# Patient Record
Sex: MALE | Race: WHITE | NOT HISPANIC OR LATINO | Employment: PART TIME
[De-identification: names, ages, dates, MRNs, and addresses within clinical notes are randomized per-mention and may not be internally consistent; named-entity substitution may affect disease eponyms.]

---

## 2019-09-28 ENCOUNTER — HEALTH MAINTENANCE LETTER (OUTPATIENT)
Age: 79
End: 2019-09-28

## 2020-03-15 ENCOUNTER — HEALTH MAINTENANCE LETTER (OUTPATIENT)
Age: 80
End: 2020-03-15

## 2021-01-10 ENCOUNTER — HEALTH MAINTENANCE LETTER (OUTPATIENT)
Age: 81
End: 2021-01-10

## 2021-05-08 ENCOUNTER — HEALTH MAINTENANCE LETTER (OUTPATIENT)
Age: 81
End: 2021-05-08

## 2021-05-25 ENCOUNTER — RECORDS - HEALTHEAST (OUTPATIENT)
Dept: ADMINISTRATIVE | Facility: CLINIC | Age: 81
End: 2021-05-25

## 2021-06-16 PROBLEM — U07.1 COVID-19: Status: ACTIVE | Noted: 2021-01-05

## 2021-10-23 ENCOUNTER — HEALTH MAINTENANCE LETTER (OUTPATIENT)
Age: 81
End: 2021-10-23

## 2022-06-04 ENCOUNTER — HEALTH MAINTENANCE LETTER (OUTPATIENT)
Age: 82
End: 2022-06-04

## 2022-08-18 ENCOUNTER — HOSPITAL ENCOUNTER (EMERGENCY)
Facility: CLINIC | Age: 82
Discharge: HOME | End: 2022-08-18
Admitting: PHYSICIAN ASSISTANT
Payer: COMMERCIAL

## 2022-08-18 ENCOUNTER — APPOINTMENT (OUTPATIENT)
Dept: RADIOLOGY | Facility: CLINIC | Age: 82
End: 2022-08-18
Payer: COMMERCIAL

## 2022-08-18 VITALS
SYSTOLIC BLOOD PRESSURE: 150 MMHG | DIASTOLIC BLOOD PRESSURE: 67 MMHG | HEART RATE: 96 BPM | RESPIRATION RATE: 16 BRPM | TEMPERATURE: 98.6 F | BODY MASS INDEX: 35.03 KG/M2 | OXYGEN SATURATION: 96 % | WEIGHT: 218 LBS | HEIGHT: 66 IN

## 2022-08-18 DIAGNOSIS — J06.9 VIRAL URI WITH COUGH: ICD-10-CM

## 2022-08-18 PROBLEM — E78.5 DYSLIPIDEMIA: Status: ACTIVE | Noted: 2022-08-18

## 2022-08-18 PROBLEM — K63.5 COLON POLYP: Status: ACTIVE | Noted: 2022-08-18

## 2022-08-18 LAB
FLUAV RNA SPEC QL NAA+PROBE: NEGATIVE
FLUBV RNA RESP QL NAA+PROBE: NEGATIVE
RSV RNA SPEC NAA+PROBE: NEGATIVE
SARS-COV-2 RNA RESP QL NAA+PROBE: NEGATIVE

## 2022-08-18 PROCEDURE — 71046 X-RAY EXAM CHEST 2 VIEWS: CPT

## 2022-08-18 PROCEDURE — 99284 EMERGENCY DEPT VISIT MOD MDM: CPT | Mod: CS,25

## 2022-08-18 PROCEDURE — 87637 SARSCOV2&INF A&B&RSV AMP PRB: CPT | Performed by: PHYSICIAN ASSISTANT

## 2022-08-18 PROCEDURE — C9803 HOPD COVID-19 SPEC COLLECT: HCPCS

## 2022-08-18 ASSESSMENT — ENCOUNTER SYMPTOMS
ARTHRALGIAS: 0
CONSTIPATION: 0
ABDOMINAL PAIN: 0
SINUS PRESSURE: 1
VOMITING: 0
WHEEZING: 0
AGITATION: 0
WOUND: 0
RHINORRHEA: 0
SINUS PAIN: 1
CONFUSION: 0
FACIAL SWELLING: 0
HEADACHES: 0
LIGHT-HEADEDNESS: 0
BACK PAIN: 0
COUGH: 1
FEVER: 0
DYSURIA: 0
COLOR CHANGE: 0
CHEST TIGHTNESS: 0
ACTIVITY CHANGE: 0
FATIGUE: 0
FACIAL ASYMMETRY: 0
VOICE CHANGE: 0
DIZZINESS: 0
ADENOPATHY: 0
SHORTNESS OF BREATH: 0
SORE THROAT: 0
HEMATURIA: 0
NUMBNESS: 1
WEAKNESS: 0
SPEECH DIFFICULTY: 0
NAUSEA: 0
PALPITATIONS: 0
MYALGIAS: 0

## 2022-08-18 ASSESSMENT — ACTIVITIES OF DAILY LIVING (ADL)
ADLS_ACUITY_SCORE: 35
ADLS_ACUITY_SCORE: 35

## 2022-08-18 NOTE — DISCHARGE INSTRUCTIONS
You were seen in the emergency department for sinus congestion and cough.  Thankfully, your COVID/influenza test was negative.  Your chest x-ray does not show any evidence of pneumonia.    Please keep a close eye on the symptoms, we suspect that this is a virus.  Follow-up with primary care in 1 to 2 weeks for hospital recheck.    Return to the emergency department with any worsening shortness of breath, chest pain or other concerns

## 2022-08-18 NOTE — ED PROVIDER NOTES
EMERGENCY DEPARTMENT ENCOUNTER      NAME: Tyrel Durbin  AGE: 82 year old male  YOB: 1940  MRN: [id number]  EVALUATION DATE & TIME: 8/18/2022  2:45 PM    PCP: Steve Fuentes    ED PROVIDER: Rosemarie Bermudez PA-C      Chief Complaint   Patient presents with     Cough     Numbness       FINAL IMPRESSION:  1. Viral URI with cough          MEDICAL DECISION MAKING:    Pertinent Labs & Imaging studies reviewed. (See chart for details)  82 year old male with a h/o paroxysmal A. fib anticoagulated on warfarin presents to the Emergency Department for evaluation of cough, sinus congestion.  On exam he is alert, nontoxic-appearing and in no acute distress.  He does have mild sinus congestion.  Oropharynx is moist.  Prolonged expiratory phase throughout lungs but no crackle or wheeze.  No heart murmur appreciated.  Vitals are WNL.  He does not have any chest pain or shortness of breath.    Differential diagnosis includes COVID-19, influenza, pneumonia, other viral URI.  COVID and influenza PCR is negative.  Chest x-ray does not show any effusion, focal infiltrate or consolidation to suggest infection.  Patient is quite well-appearing, very active and no concerning findings at this time.  Discussed that this likely represents a viral URI.  He has very close follow-up with his primary care provider in the next 3 days.    He also reports this outer left thigh area of numbness and tingling which occurred yesterday, it is resolved at this time.  He does not have any focal neurological deficits.  His gait is steady and balanced.  Discussed that this could represent an element of sciatica, radiculopathy or other nerve impingement.  It is reassuring that symptoms have improved today.    There is no evidence of acute or emergent process requiring intervention at this time. Pt is appropriate for outpatient management. Provisional nature of today's diagnosis was discussed and strict return precautions were given. Pt  expressed understanding and He was discharged to home in good condition.     CRITICAL CARE: None    ED COURSE  2:40 PM  Met and evaluated patient. Discussed ED plan.   5:00 PM discharged to home in good condition by RN.       MEDICATIONS GIVEN IN THE EMERGENCY:  Medications - No data to display    NEW PRESCRIPTIONS STARTED AT TODAY'S ER VISIT  Discharge Medication List as of 8/18/2022  5:14 PM             =================================================================    HPI    Patient information was obtained from: Patient    Use of Intrepreter: N/A       Tyrel Durbin is a 82 year old male who presents for evaluation of cough and sinus congestion x2 days.  States that symptoms feel similar to his prior COVID infection.  He is vaccinated against COVID.  He does not have any known ill contacts.  He does not have any shortness of breath or chest pain.  No nausea or vomiting.  He also describes this area on the outer left thigh of numbness which she noticed while shaving his leg and had a sensation of water dripping/bugs crawling but looked down and the skin appeared normal.  This occurred yesterday morning and has slowly resolved.  It is not present at this time.  He does not have any weakness of the leg, and no extension of the paresthesia.      REVIEW OF SYSTEMS   Review of Systems   Constitutional: Negative for activity change, fatigue and fever.   HENT: Positive for sinus pressure and sinus pain. Negative for facial swelling, rhinorrhea, sore throat and voice change.    Eyes: Negative for visual disturbance.   Respiratory: Positive for cough. Negative for chest tightness, shortness of breath and wheezing.    Cardiovascular: Negative for chest pain and palpitations.   Gastrointestinal: Negative for abdominal pain, constipation, nausea and vomiting.   Genitourinary: Negative for dysuria and hematuria.   Musculoskeletal: Negative for arthralgias, back pain and myalgias.   Skin: Negative for color change, pallor,  rash and wound.   Neurological: Positive for numbness (left lateral thigh). Negative for dizziness, facial asymmetry, speech difficulty, weakness, light-headedness and headaches.   Hematological: Negative for adenopathy.   Psychiatric/Behavioral: Negative for agitation, behavioral problems and confusion.   All other systems reviewed and are negative.        PAST MEDICAL HISTORY:  No past medical history on file.    PAST SURGICAL HISTORY:  No past surgical history on file.        CURRENT MEDICATIONS:    cetirizine (ZYRTEC) 10 MG tablet  co-enzyme Q-10 30 mg capsule  fish oil-omega-3 fatty acids (FISH OIL) 300-1,000 mg capsule  GLUTATHIONE ORAL  magnesium oxide (MAG-OX) 200 mg  multivitamin therapeutic tablet  warfarin ANTICOAGULANT (COUMADIN/JANTOVEN) 5 MG tablet        ALLERGIES:  No Known Allergies    FAMILY HISTORY:  No family history on file.    SOCIAL HISTORY:   Social History     Socioeconomic History     Marital status:      Spouse name: Not on file     Number of children: Not on file     Years of education: Not on file     Highest education level: Not on file   Occupational History     Not on file   Tobacco Use     Smoking status: Not on file     Smokeless tobacco: Not on file   Substance and Sexual Activity     Alcohol use: Not on file     Drug use: Not on file     Sexual activity: Not on file   Other Topics Concern     Not on file   Social History Narrative     Not on file     Social Determinants of Health     Financial Resource Strain: Not on file   Food Insecurity: Not on file   Transportation Needs: Not on file   Physical Activity: Not on file   Stress: Not on file   Social Connections: Not on file   Intimate Partner Violence: Not on file   Housing Stability: Not on file         VITALS:  Patient Vitals for the past 24 hrs:   BP Temp Temp src Pulse Resp SpO2 Height Weight   08/18/22 1609 (!) 150/67 -- -- 96 16 96 % -- --   08/18/22 1334 (!) 148/93 98.6  F (37  C) Temporal 103 18 97 % 1.676 m (5'  "6\") 98.9 kg (218 lb)       PHYSICAL EXAM    Physical Exam  Vitals reviewed.   Constitutional:       General: He is not in acute distress.     Appearance: He is well-developed. He is not ill-appearing, toxic-appearing or diaphoretic.   HENT:      Head: Normocephalic and atraumatic.      Nose: Nose normal.      Mouth/Throat:      Mouth: Mucous membranes are moist.      Pharynx: Oropharynx is clear.   Eyes:      General: No scleral icterus.     Conjunctiva/sclera: Conjunctivae normal.   Cardiovascular:      Rate and Rhythm: Normal rate and regular rhythm.      Pulses: Normal pulses.      Heart sounds: No murmur heard.  Pulmonary:      Effort: Pulmonary effort is normal. No tachypnea or respiratory distress.      Breath sounds: Normal breath sounds. No stridor. No decreased breath sounds or wheezing.   Chest:      Chest wall: No tenderness.   Abdominal:      Palpations: Abdomen is soft. There is no mass.      Tenderness: There is no abdominal tenderness. There is no guarding or rebound.   Musculoskeletal:      Cervical back: Normal range of motion and neck supple. No rigidity.      Right lower leg: No edema.      Left lower leg: No edema.   Skin:     General: Skin is warm and dry.      Capillary Refill: Capillary refill takes less than 2 seconds.      Coloration: Skin is not pale.      Findings: No rash.   Neurological:      General: No focal deficit present.      Mental Status: He is alert and oriented to person, place, and time.      Cranial Nerves: No cranial nerve deficit.   Psychiatric:         Mood and Affect: Mood normal.         Behavior: Behavior normal.          LAB:  All pertinent labs reviewed and interpreted.    Labs Ordered and Resulted from Time of ED Arrival to Time of ED Departure   INFLUENZA A/B & SARS-COV2 PCR MULTIPLEX - Normal       Result Value    Influenza A PCR Negative      Influenza B PCR Negative      RSV PCR Negative      SARS CoV2 PCR Negative           RADIOLOGY:  Reviewed all pertinent " imaging. Please see official radiology report    Chest XR,  PA & LAT   Final Result   IMPRESSION: Mild left basilar atelectasis/scarring. No discrete airspace consolidation. Small calcified granuloma at the right lung base.      No pleural effusion or pneumothorax.      Cardiomediastinal silhouette is normal.          Rosemarie Bermudez PA-C  Emergency Medicine  [facility] EMERGENCY ROOM  [address] [id number] [phone]  Dept: [phone]    This note has in part been created with speech recognition technology and may create an occasional, unintended word/grammar substitution. Errors are generally corrected in real time. Please message me via [employer] In Basket if you note any errors requiring clarification.       Rosemarie Bermudez PA-C  08/18/22 [id number]

## 2022-08-18 NOTE — ED TRIAGE NOTES
The patient presents to the ED with a cough that has been present for 2 days. The patient has not been tested for covid. He reports a headache as well. He was concerned today because he noted some intermittent numbness in his left outer thigh.

## 2022-10-10 ENCOUNTER — HEALTH MAINTENANCE LETTER (OUTPATIENT)
Age: 82
End: 2022-10-10

## 2023-08-19 ENCOUNTER — HEALTH MAINTENANCE LETTER (OUTPATIENT)
Age: 83
End: 2023-08-19

## 2024-01-21 ENCOUNTER — ANESTHESIA EVENT (OUTPATIENT)
Dept: SURGERY | Facility: CLINIC | Age: 84
End: 2024-01-21
Payer: COMMERCIAL

## 2024-01-22 ENCOUNTER — HOSPITAL ENCOUNTER (OUTPATIENT)
Facility: CLINIC | Age: 84
Discharge: HOME | End: 2024-01-22
Attending: INTERNAL MEDICINE | Admitting: INTERNAL MEDICINE
Payer: COMMERCIAL

## 2024-01-22 ENCOUNTER — ANESTHESIA (OUTPATIENT)
Dept: SURGERY | Facility: CLINIC | Age: 84
End: 2024-01-22
Payer: COMMERCIAL

## 2024-01-22 VITALS
HEIGHT: 66 IN | BODY MASS INDEX: 35.03 KG/M2 | SYSTOLIC BLOOD PRESSURE: 124 MMHG | OXYGEN SATURATION: 96 % | HEART RATE: 109 BPM | RESPIRATION RATE: 16 BRPM | WEIGHT: 218 LBS | TEMPERATURE: 97.9 F | DIASTOLIC BLOOD PRESSURE: 73 MMHG

## 2024-01-22 LAB — COLONOSCOPY: NORMAL

## 2024-01-22 PROCEDURE — 258N000003 HC RX IP 258 OP 636: Performed by: ANESTHESIOLOGY

## 2024-01-22 PROCEDURE — 250N000009 HC RX 250: Performed by: NURSE ANESTHETIST, CERTIFIED REGISTERED

## 2024-01-22 PROCEDURE — 88305 TISSUE EXAM BY PATHOLOGIST: CPT | Mod: TC | Performed by: INTERNAL MEDICINE

## 2024-01-22 PROCEDURE — 360N000075 HC SURGERY LEVEL 2, PER MIN: Performed by: INTERNAL MEDICINE

## 2024-01-22 PROCEDURE — 250N000011 HC RX IP 250 OP 636: Performed by: NURSE ANESTHETIST, CERTIFIED REGISTERED

## 2024-01-22 PROCEDURE — 999N000141 HC STATISTIC PRE-PROCEDURE NURSING ASSESSMENT: Performed by: INTERNAL MEDICINE

## 2024-01-22 PROCEDURE — 710N000012 HC RECOVERY PHASE 2, PER MINUTE: Performed by: INTERNAL MEDICINE

## 2024-01-22 PROCEDURE — 272N000001 HC OR GENERAL SUPPLY STERILE: Performed by: INTERNAL MEDICINE

## 2024-01-22 PROCEDURE — 370N000017 HC ANESTHESIA TECHNICAL FEE, PER MIN: Performed by: INTERNAL MEDICINE

## 2024-01-22 RX ORDER — PROPOFOL 10 MG/ML
INJECTION, EMULSION INTRAVENOUS CONTINUOUS PRN
Status: DISCONTINUED | OUTPATIENT
Start: 2024-01-22 | End: 2024-01-22

## 2024-01-22 RX ORDER — LIDOCAINE 40 MG/G
CREAM TOPICAL
Status: DISCONTINUED | OUTPATIENT
Start: 2024-01-22 | End: 2024-01-22 | Stop reason: HOSPADM

## 2024-01-22 RX ORDER — HYDROMORPHONE HCL IN WATER/PF 6 MG/30 ML
0.2 PATIENT CONTROLLED ANALGESIA SYRINGE INTRAVENOUS EVERY 5 MIN PRN
Status: DISCONTINUED | OUTPATIENT
Start: 2024-01-22 | End: 2024-01-22 | Stop reason: HOSPADM

## 2024-01-22 RX ORDER — ONDANSETRON 2 MG/ML
4 INJECTION INTRAMUSCULAR; INTRAVENOUS EVERY 30 MIN PRN
Status: DISCONTINUED | OUTPATIENT
Start: 2024-01-22 | End: 2024-01-22 | Stop reason: HOSPADM

## 2024-01-22 RX ORDER — PROCHLORPERAZINE MALEATE 5 MG
5 TABLET ORAL EVERY 6 HOURS PRN
Status: DISCONTINUED | OUTPATIENT
Start: 2024-01-22 | End: 2024-01-22 | Stop reason: HOSPADM

## 2024-01-22 RX ORDER — HYDROMORPHONE HCL IN WATER/PF 6 MG/30 ML
0.4 PATIENT CONTROLLED ANALGESIA SYRINGE INTRAVENOUS EVERY 5 MIN PRN
Status: DISCONTINUED | OUTPATIENT
Start: 2024-01-22 | End: 2024-01-22 | Stop reason: HOSPADM

## 2024-01-22 RX ORDER — NALOXONE HYDROCHLORIDE 0.4 MG/ML
0.2 INJECTION, SOLUTION INTRAMUSCULAR; INTRAVENOUS; SUBCUTANEOUS
Status: DISCONTINUED | OUTPATIENT
Start: 2024-01-22 | End: 2024-01-22 | Stop reason: HOSPADM

## 2024-01-22 RX ORDER — ONDANSETRON 4 MG/1
4 TABLET, ORALLY DISINTEGRATING ORAL EVERY 30 MIN PRN
Status: DISCONTINUED | OUTPATIENT
Start: 2024-01-22 | End: 2024-01-22 | Stop reason: HOSPADM

## 2024-01-22 RX ORDER — OXYCODONE HYDROCHLORIDE 5 MG/1
10 TABLET ORAL
Status: DISCONTINUED | OUTPATIENT
Start: 2024-01-22 | End: 2024-01-22 | Stop reason: HOSPADM

## 2024-01-22 RX ORDER — LIDOCAINE HYDROCHLORIDE 10 MG/ML
INJECTION, SOLUTION INFILTRATION; PERINEURAL PRN
Status: DISCONTINUED | OUTPATIENT
Start: 2024-01-22 | End: 2024-01-22

## 2024-01-22 RX ORDER — FENTANYL CITRATE 50 UG/ML
25 INJECTION, SOLUTION INTRAMUSCULAR; INTRAVENOUS EVERY 5 MIN PRN
Status: DISCONTINUED | OUTPATIENT
Start: 2024-01-22 | End: 2024-01-22 | Stop reason: HOSPADM

## 2024-01-22 RX ORDER — OXYCODONE HYDROCHLORIDE 5 MG/1
5 TABLET ORAL
Status: DISCONTINUED | OUTPATIENT
Start: 2024-01-22 | End: 2024-01-22 | Stop reason: HOSPADM

## 2024-01-22 RX ORDER — ONDANSETRON 2 MG/ML
4 INJECTION INTRAMUSCULAR; INTRAVENOUS EVERY 6 HOURS PRN
Status: DISCONTINUED | OUTPATIENT
Start: 2024-01-22 | End: 2024-01-22 | Stop reason: HOSPADM

## 2024-01-22 RX ORDER — ONDANSETRON 2 MG/ML
4 INJECTION INTRAMUSCULAR; INTRAVENOUS
Status: DISCONTINUED | OUTPATIENT
Start: 2024-01-22 | End: 2024-01-22 | Stop reason: HOSPADM

## 2024-01-22 RX ORDER — PROPOFOL 10 MG/ML
INJECTION, EMULSION INTRAVENOUS PRN
Status: DISCONTINUED | OUTPATIENT
Start: 2024-01-22 | End: 2024-01-22

## 2024-01-22 RX ORDER — NALOXONE HYDROCHLORIDE 0.4 MG/ML
0.4 INJECTION, SOLUTION INTRAMUSCULAR; INTRAVENOUS; SUBCUTANEOUS
Status: DISCONTINUED | OUTPATIENT
Start: 2024-01-22 | End: 2024-01-22 | Stop reason: HOSPADM

## 2024-01-22 RX ORDER — FLUMAZENIL 0.1 MG/ML
0.2 INJECTION, SOLUTION INTRAVENOUS
Status: DISCONTINUED | OUTPATIENT
Start: 2024-01-22 | End: 2024-01-22 | Stop reason: HOSPADM

## 2024-01-22 RX ORDER — METOPROLOL SUCCINATE 25 MG/1
1 TABLET, EXTENDED RELEASE ORAL DAILY
COMMUNITY
Start: 2023-12-18

## 2024-01-22 RX ORDER — SODIUM CHLORIDE, SODIUM LACTATE, POTASSIUM CHLORIDE, CALCIUM CHLORIDE 600; 310; 30; 20 MG/100ML; MG/100ML; MG/100ML; MG/100ML
INJECTION, SOLUTION INTRAVENOUS CONTINUOUS
Status: DISCONTINUED | OUTPATIENT
Start: 2024-01-22 | End: 2024-01-22 | Stop reason: HOSPADM

## 2024-01-22 RX ORDER — FENTANYL CITRATE 50 UG/ML
50 INJECTION, SOLUTION INTRAMUSCULAR; INTRAVENOUS EVERY 5 MIN PRN
Status: DISCONTINUED | OUTPATIENT
Start: 2024-01-22 | End: 2024-01-22 | Stop reason: HOSPADM

## 2024-01-22 RX ORDER — ONDANSETRON 4 MG/1
4 TABLET, ORALLY DISINTEGRATING ORAL EVERY 6 HOURS PRN
Status: DISCONTINUED | OUTPATIENT
Start: 2024-01-22 | End: 2024-01-22 | Stop reason: HOSPADM

## 2024-01-22 RX ORDER — TAMSULOSIN HYDROCHLORIDE 0.4 MG/1
CAPSULE ORAL
COMMUNITY
Start: 2023-06-24

## 2024-01-22 RX ADMIN — LIDOCAINE HYDROCHLORIDE 3 ML: 10 INJECTION, SOLUTION INFILTRATION; PERINEURAL at 08:07

## 2024-01-22 RX ADMIN — PROPOFOL 125 MCG/KG/MIN: 10 INJECTION, EMULSION INTRAVENOUS at 08:07

## 2024-01-22 RX ADMIN — PROPOFOL 50 MCG/KG/MIN: 10 INJECTION, EMULSION INTRAVENOUS at 07:42

## 2024-01-22 RX ADMIN — PROPOFOL 30 MG: 10 INJECTION, EMULSION INTRAVENOUS at 08:07

## 2024-01-22 RX ADMIN — SODIUM CHLORIDE, POTASSIUM CHLORIDE, SODIUM LACTATE AND CALCIUM CHLORIDE: 600; 310; 30; 20 INJECTION, SOLUTION INTRAVENOUS at 07:37

## 2024-01-22 ASSESSMENT — ACTIVITIES OF DAILY LIVING (ADL)
ADLS_ACUITY_SCORE: 35
ADLS_ACUITY_SCORE: 35

## 2024-01-22 NOTE — ANESTHESIA POSTPROCEDURE EVALUATION
Patient: Tyrel Durbin    Procedure: Procedure(s):  COLONOSCOPY with polypectomy x12       Anesthesia Type:  MAC    Note:  Disposition: Outpatient   Postop Pain Control:             Sign Out: Well controlled pain   PONV: No   Neuro/Psych:             Sign Out: Acceptable/Baseline neuro status   Airway/Respiratory:             Sign Out: Acceptable/Baseline resp. status   CV/Hemodynamics:             Sign Out: Acceptable CV status   Other NRE: NONE   DID A NON-ROUTINE EVENT OCCUR?            Last vitals:  Vitals Value Taken Time   /73 01/22/24 0842   Temp 36.6  C (97.9  F) 01/22/24 0842   Pulse 92 01/22/24 0850   Resp     SpO2 98 % 01/22/24 0851   Vitals shown include unfiled device data.    Electronically Signed By: David Schreiber MD  January 22, 2024  2:13 PM

## 2024-01-22 NOTE — ANESTHESIA CARE TRANSFER NOTE
Patient: Tyrel Durbin    Procedure: Procedure(s):  COLONOSCOPY with polypectomy x12       Diagnosis: Screening for colon cancer [Z12.11]  History of colon polyps [Z86.010]  Diagnosis Additional Information: No value filed.    Anesthesia Type:   MAC     Note:    Oropharynx: oropharynx clear of all foreign objects  Level of Consciousness: drowsy  Oxygen Supplementation: room air    Independent Airway: airway patency satisfactory and stable    Vital Signs Stable: post-procedure vital signs reviewed and stable  Report to RN Given: handoff report given  Patient transferred to: Phase II    Handoff Report: Identifed the Patient, Identified the Reponsible Provider, Reviewed the pertinent medical history, Discussed the surgical course, Reviewed Intra-OP anesthesia mangement and issues during anesthesia, Set expectations for post-procedure period and Allowed opportunity for questions and acknowledgement of understanding      Vitals:  Vitals Value Taken Time   BP     Temp     Pulse     Resp     SpO2         Electronically Signed By: ANGELICA SOTO CRNA  January 22, 2024  8:41 AM

## 2024-01-22 NOTE — PROGRESS NOTES
Pre-procedure Note    Reason for procedure: Adenoma surveillance    History and Physical Reviewed: Reviewed, no changes.    Pre-sedation assessment:    General: alert, appears stated age, and cooperative  Airway: normal  Heart: Irregular rate  Lungs: clear to auscultation bilaterally    Sedation Plan based on assessment: MAC    Mallampati score: Class III (visualization of the soft palate and base of uvula)          ASA Classification: ASA 3 - Patient with moderate systemic disease with functional limitations    Impression: Patient deemed adequate candidate for MAC sedation    Risks, benefits and alternatives were discussed with the patient and informed consent was obtained.    Plan: colonoscopy      Kevin Keller MD 1/22/2024 7:54 AM                                               Kevin Keller MD  Thank you for the opportunity to participate in the care of this patient.   Please feel free to call me with any questions or concerns.  Phone number [phone].

## 2024-01-22 NOTE — ANESTHESIA PREPROCEDURE EVALUATION
Anesthesia Pre-Procedure Evaluation    Patient: Tyrel Durbin   MRN: [id number] : 1940        Procedure : Procedure(s):  COLONOSCOPY          Past Medical History:   Diagnosis Date    Chronic atrial fibrillation (H)     Sleep apnea       Past Surgical History:   Procedure Laterality Date    AJCC COLON CANCER, STAGE I DOCUMENTED        No Known Allergies   Social History     Tobacco Use    Smoking status: Not on file    Smokeless tobacco: Not on file   Substance Use Topics    Alcohol use: Yes     Comment: 4 drinks per week      Wt Readings from Last 1 Encounters:   24 98.9 kg (218 lb)        Anesthesia Evaluation            ROS/MED HX  ENT/Pulmonary:       Neurologic:  - neg neurologic ROS     Cardiovascular:  - neg cardiovascular ROS     METS/Exercise Tolerance: >4 METS    Hematologic:  - neg hematologic  ROS     Musculoskeletal:  - neg musculoskeletal ROS     GI/Hepatic:  - neg GI/hepatic ROS     Renal/Genitourinary:  - neg Renal ROS     Endo:  - neg endo ROS     Psychiatric/Substance Use:  - neg psychiatric ROS     Infectious Disease:  - neg infectious disease ROS     Malignancy:  - neg malignancy ROS     Other:  - neg other ROS          Physical Exam    Airway  airway exam normal      Mallampati: II       Respiratory Devices and Support         Dental  no notable dental history         Cardiovascular   cardiovascular exam normal          Pulmonary   pulmonary exam normal                OUTSIDE LABS:  CBC:   Lab Results   Component Value Date    WBC 7.4 2021    WBC 3.2 (L) 2021    HGB 13.5 (L) 2021    HGB 13.8 (L) 2021    HCT 39.8 (L) 2021    HCT 40.3 2021     2021     2021     BMP:   Lab Results   Component Value Date     2021     2021    POTASSIUM 4.3 2021    POTASSIUM 4.3 2021    CHLORIDE 108 (H) 2021    CHLORIDE 110 (H) 2021    CO2 24 2021    CO2 21 (L) 2021    BUN 14  "01/07/2021    BUN 15 01/06/2021    CR 0.71 01/07/2021    CR 0.68 (L) 01/06/2021     (H) 01/07/2021     (H) 01/06/2021     COAGS:   Lab Results   Component Value Date    INR 2.31 (H) 01/07/2021    FIBR 743 (H) 01/05/2021     POC: No results found for: \"BGM\", \"HCG\", \"HCGS\"  HEPATIC:   Lab Results   Component Value Date    ALBUMIN 2.5 (L) 01/05/2021    PROTTOTAL 6.3 01/05/2021    ALT 26 01/05/2021    AST 37 01/05/2021    ALKPHOS 67 01/05/2021    BILITOTAL 0.7 01/05/2021     OTHER:   Lab Results   Component Value Date    RANDY 8.2 (L) 01/07/2021    CRP 2.1 (H) 01/07/2021       Anesthesia Plan    ASA Status:  3       Anesthesia Type: MAC.              Consents    Anesthesia Plan(s) and associated risks, benefits, and realistic alternatives discussed. Questions answered and patient/representative(s) expressed understanding.     - Discussed:     - Discussed with:  Patient            Postoperative Care            Comments:               David Schreiber MD    I have reviewed the pertinent notes and labs in the chart from the past 30 days and (re)examined the patient.  Any updates or changes from those notes are reflected in this note.            # Drug Induced Coagulation Defect: home medication list includes an anticoagulant medication   # Obesity: Estimated body mass index is 35.19 kg/m  as calculated from the following:    Height as of this encounter: 1.676 m (5' 6\").    Weight as of this encounter: 98.9 kg (218 lb).      "

## 2024-01-23 LAB
PATH REPORT.COMMENTS IMP SPEC: NORMAL
PATH REPORT.COMMENTS IMP SPEC: NORMAL
PATH REPORT.FINAL DX SPEC: NORMAL
PATH REPORT.GROSS SPEC: NORMAL
PATH REPORT.MICROSCOPIC SPEC OTHER STN: NORMAL
PATH REPORT.RELEVANT HX SPEC: NORMAL
PHOTO IMAGE: NORMAL

## 2024-01-23 PROCEDURE — 88305 TISSUE EXAM BY PATHOLOGIST: CPT | Mod: 26 | Performed by: PATHOLOGY

## 2024-10-12 ENCOUNTER — HEALTH MAINTENANCE LETTER (OUTPATIENT)
Age: 84
End: 2024-10-12

## 2024-11-21 ENCOUNTER — TRANSCRIBE ORDERS (OUTPATIENT)
Dept: OTHER | Age: 84
End: 2024-11-21

## 2024-11-21 DIAGNOSIS — Z95.2 S/P TAVR (TRANSCATHETER AORTIC VALVE REPLACEMENT): ICD-10-CM

## 2024-11-21 DIAGNOSIS — I35.0 CALCIFIC AORTIC VALVE STENOSIS: Primary | ICD-10-CM

## 2024-11-26 ENCOUNTER — HOSPITAL ENCOUNTER (OUTPATIENT)
Dept: CARDIAC REHAB | Facility: CLINIC | Age: 84
Discharge: HOME | End: 2024-11-26
Attending: NURSE PRACTITIONER
Payer: COMMERCIAL

## 2024-11-26 PROCEDURE — 93798 PHYS/QHP OP CAR RHAB W/ECG: CPT

## 2024-11-26 PROCEDURE — 93797 PHYS/QHP OP CAR RHAB WO ECG: CPT

## 2024-12-03 ENCOUNTER — HOSPITAL ENCOUNTER (EMERGENCY)
Facility: CLINIC | Age: 84
Discharge: HOME | End: 2024-12-03
Attending: EMERGENCY MEDICINE | Admitting: EMERGENCY MEDICINE
Payer: COMMERCIAL

## 2024-12-03 ENCOUNTER — HOSPITAL ENCOUNTER (OUTPATIENT)
Dept: CARDIAC REHAB | Facility: CLINIC | Age: 84
Discharge: HOME | End: 2024-12-03
Attending: NURSE PRACTITIONER
Payer: COMMERCIAL

## 2024-12-03 VITALS
TEMPERATURE: 97.1 F | HEART RATE: 94 BPM | BODY MASS INDEX: 36.48 KG/M2 | WEIGHT: 226 LBS | DIASTOLIC BLOOD PRESSURE: 65 MMHG | SYSTOLIC BLOOD PRESSURE: 144 MMHG | RESPIRATION RATE: 18 BRPM | OXYGEN SATURATION: 96 %

## 2024-12-03 DIAGNOSIS — T81.72XA THROMBOPHLEBITIS DUE TO PROCEDURE: ICD-10-CM

## 2024-12-03 DIAGNOSIS — I80.9 THROMBOPHLEBITIS DUE TO PROCEDURE: ICD-10-CM

## 2024-12-03 PROCEDURE — 93798 PHYS/QHP OP CAR RHAB W/ECG: CPT

## 2024-12-03 PROCEDURE — 99282 EMERGENCY DEPT VISIT SF MDM: CPT

## 2024-12-03 ASSESSMENT — COLUMBIA-SUICIDE SEVERITY RATING SCALE - C-SSRS
1. IN THE PAST MONTH, HAVE YOU WISHED YOU WERE DEAD OR WISHED YOU COULD GO TO SLEEP AND NOT WAKE UP?: NO
6. HAVE YOU EVER DONE ANYTHING, STARTED TO DO ANYTHING, OR PREPARED TO DO ANYTHING TO END YOUR LIFE?: NO
2. HAVE YOU ACTUALLY HAD ANY THOUGHTS OF KILLING YOURSELF IN THE PAST MONTH?: NO

## 2024-12-03 NOTE — ED NOTES
Pt left the ER department prior to receiving discharge paperwork. Therefore, no discharge vital taken as room was found empty after the provider saw them.

## 2024-12-03 NOTE — ED PROVIDER NOTES
Emergency Department Midlevel Supervisory Note     I had a face to face encounter with this patient seen by the Advanced Practice Provider ([facility]). I personally made/approved the management plan and take responsibility for the patient management. I personally saw patient and performed a substantive portion of the visit including all aspects of the medical decision making.     ED Course:  1:30 PM Sherlyn Collier PA-C staffed patient with me. I agree with their assessment and plan of management, and I will see the patient.  1:40 PM I met with the patient to introduce myself, gather additional history, perform my initial exam, and discuss the plan.     Brief HPI:     Tyrel Durbin is a 84 year old male who presents for evaluation of right forearm discomfort.  He reports firmness and discomfort just distal to recent IV access site.  Pain actually improving after onset 4 days earlier.  Denies any chest pain shortness of breath.  No fevers or chills.      I, Blas John, am serving as a scribe to document services personally performed by Kevin Gamble MD, based on my observations and the provider's statements to me.   I, Kevin Gamble MD,  attest that Blas John was acting in a scribe capacity, has observed my performance of the services and has documented them in accordance with my direction.    Brief Physical Exam: BP (!) 144/65   Pulse 94   Temp 97.1  F (36.2  C) (Oral)   Resp 18   Wt 102.5 kg (226 lb)   SpO2 96%   BMI 36.48 kg/m    Constitutional:  Alert, in mild acute distress  EYES: Conjunctivae clear  HENT:  Atraumatic  Respiratory:  Respirations even, unlabored, in no acute respiratory distress  Cardiovascular:  Regular rate and rhythm, good peripheral perfusion  GI: Soft, non-distended, non-tender  Musculoskeletal:  Moves all 4 extremities equally, grossly symmetrical strength  Integument: Warm & dry. No appreciable rash, minimal faint erythema to the mid dorsal radial aspect of the forearm.  Slight  firmness and tenderness.      Neurologic:  Alert & oriented, speech clear and fluent, no focal deficits noted  Psych: Normal mood and affect       MDM:  Patient with right forearm discomfort just distal to recent IV access site.  Presentation and findings consistent with superficial thrombophlebitis.  No evidence of obvious cellulitic process.  Patient reports symptoms improving.  No indications for laboratory evaluation and imaging.  Routine return precautions given.       Right forearm thrombophlebitis      [facility] EMERGENCY ROOM  [address] [id number] [phone]       Keivn Gamble MD  12/03/24 [id number]

## 2024-12-03 NOTE — DISCHARGE INSTRUCTIONS
Based on your physical exam and recent surgery, your symptoms are very consistent with superficial thrombophlebitis. This is inflammation of the veins that are commonly caused by IV insertion sites. Thankfully, the overlying skin does not appear infected and does not require antibiotics. For symptoms, please use tylenol and ibuprofen as needed for discomfort. I also recommend warm compresses for 10 mins daily, up to 4 times per day. Continue to follow-up with your PCP. Please return to the ED for any new or worsening symptoms.

## 2024-12-03 NOTE — ED PROVIDER NOTES
Emergency Department Encounter   NAME: Tyrel Durbin  AGE: 84 year old male   YOB: 1940 ;   MRN: [id number] ;    ED PROVIDER: Sherlyn Collier PA-C    PCP: Steve Fuentes    Evaluation Date & Time:   12/3/2024  1:29 PM    CHIEF COMPLAINT:  Arm Pain      FINAL IMPRESSION:    ICD-10-CM    1. Thrombophlebitis due to procedure  T81.72XA     I80.9     R arm          IMPRESSION AND PLAN   MDM: Tyrel Durbin is a 84 year old male with a pertinent history of A-fib currently anticoagulated with warfarin, HLD, aortic stenosis s/p TAVR on 11/20/2024 who presents to the ED by walk-in for evaluation of erythema and tenderness on his RUE after IV insertion from TAVR procedure. Patient informed by cardiology clinic to seek further evaluation in ED due to concerns for blood clot.    Vitals - hypertensive otherwise vitally stable. On exam patient is well-appearing and in no acute distress. Small area of erythema just distal to the antecubital fossa on the RUE. There is a palpable mass underlying this area. No ecchymosis, edema or open wounds. Active ROM of RUE intact. Distal CMS and pulses intact. Given that patient is on anticoagulation therapy, I have low suspicion for DVT of upper extremity. Based on recent surgery and symptomatology, I highly suspect superficial thrombophlebitis to be the cause of patient's symptoms. Certainly no warmth, edema or open wounds to suggest cellulitis vs abscess. Patient denies chest pain, SOB. He is not tachycardic and satting at 96% RA, little concern for PE.       I discussed my physical exam findings with patient and he expressed his understanding. I stated that clinically, the symptoms are consistent with superficial thrombophlebitis. Due to my low suspicion for DVT, I do not feel that blood work or imaging are necessary at this time. I recommended patient take tylenol and use warm compresses on the area for the next several days until his symptoms resolve. Without an overlying  skin infection, I do not feel that antibiotics are necessary. Patient is agreeable to this plan and feels comfortable with discharge at this time.     Patient seen in conjunction with Dr. Gamble.    Medical Decision Making  Obtained supplemental history:Supplemental history obtained?: No  Reviewed external records: External records reviewed?: Documented in chart and Inpatient Record: [facility] Admission Summary from 11/21/24.   Care impacted by chronic illness:Documented in Chart  Care significantly affected by social determinants of health:N/A  Did you consider but not order tests?: Work up considered but not performed and documented in chart, if applicable  Did you interpret images independently?: Independent interpretation of ECG and images noted in documentation, when applicable.  Consultation discussion with other provider:Did you involve another provider (consultant, , pharmacy, etc.)?: No  Discharge. No recommendations on prescription strength medication(s). See documentation for any additional details.    Not Applicable       ED COURSE:  1:33 PM I met and introduced myself to the patient. I gathered initial history and performed my physical exam. We discussed plan for initial workup.   1:39 PM I have staffed the patient with Dr. Gamble, ED MD, who has evaluated the patient and agrees with all aspects of today's care.   1:48 PM I rechecked the patient and discussed results, discharge, follow up, and reasons to return to the ED.           MEDICATIONS GIVEN IN THE EMERGENCY DEPARTMENT:  Medications - No data to display      NEW PRESCRIPTIONS STARTED AT TODAY'S ED VISIT:  Discharge Medication List as of 12/3/2024  1:50 PM            BRIEF HPI   Patient information was obtained from: Patient   Use of Intrepreter: N/A     Tyrel Durbin is a 84 year old male who presents emergency department for mild tenderness, erythema and and a palpable mass just distal to the antecubital fossa of the right arm.  Patient  reports that 10 days ago, he did undergo a TAVR due to to worsening aortic valve calcification/stenosis.  Patient states the IV was placed directly in the area where he is experiencing symptoms.  He did call his cardiology clinic this morning they recommended he be seen in the ED for further evaluation and to rule out a blood clot.  Patient otherwise denies fever, chills, chest pain, shortness of breath, inability to move the extremity.    I reviewed hospital discharge note from 11/21/2024.  Patient hospitalized for aortic valve stenosis that was recently progressed to severe with mild CAD.  He was admitted for an elective TAVR procedure.  Patient was performed without any complication, patient tolerated the procedure well.  Warfarin was resumed the evening postprocedure.  Patient started on daily baby aspirin s/p TAVR, no Plavix due to high risk of bleeding with Coumadin already.  Patient discharged in stable condition.      REVIEW OF SYSTEMS:  Pertinent positive and negative symptoms per HPI.       MEDICAL HISTORY     Past Medical History:   Diagnosis Date    Chronic atrial fibrillation (H)     Sleep apnea        Past Surgical History:   Procedure Laterality Date    AJCC COLON CANCER, STAGE I DOCUMENTED      COLONOSCOPY N/A 1/22/2024    Procedure: COLONOSCOPY with polypectomy x12;  Surgeon: Kevin Keller MD;  Location: [facility] Main OR       No family history on file.    Social History     Vaping Use    Vaping status: Never Used   Substance Use Topics    Alcohol use: Yes     Comment: 4 drinks per week    Drug use: Never         PHYSICAL EXAM     First Vitals:  Patient Vitals for the past 24 hrs:   BP Temp Temp src Pulse Resp SpO2 Weight   12/03/24 1324 (!) 144/65 97.1  F (36.2  C) Oral 94 18 96 % 102.5 kg (226 lb)       PHYSICAL EXAM:  Physical Exam  Vitals and nursing note reviewed.   Constitutional:       General: He is not in acute distress.     Appearance: Normal appearance. He is normal weight. He is  not ill-appearing or toxic-appearing.   Musculoskeletal:      Right elbow: Normal.      Right forearm: Swelling and tenderness present. No deformity, lacerations or bony tenderness.      Left forearm: Normal.      Right wrist: Normal.   Skin:     General: Skin is warm and dry.      Comments: Small area of erythema just distal to the antecubital fossa on the RUE. There is a palpable mass underlying this area. No ecchymosis, edema or open wounds.   Neurological:      General: No focal deficit present.      Mental Status: He is alert and oriented to person, place, and time.   Psychiatric:         Mood and Affect: Mood normal.          RESULTS     LAB:  All pertinent labs reviewed and interpreted  Labs Ordered and Resulted from Time of ED Arrival to Time of ED Departure - No data to display      RADIOLOGY:  No orders to display         Sherlyn Collier PA-C  Emergency Medicine   [facility] EMERGENCY ROOM       Sherlyn Collier PA-C  12/03/24 1539

## 2024-12-09 ENCOUNTER — TRANSCRIBE ORDERS (OUTPATIENT)
Dept: OTHER | Age: 84
End: 2024-12-09

## 2024-12-09 ENCOUNTER — HOSPITAL ENCOUNTER (OUTPATIENT)
Dept: CARDIAC REHAB | Facility: CLINIC | Age: 84
Discharge: HOME | End: 2024-12-09
Attending: NURSE PRACTITIONER
Payer: COMMERCIAL

## 2024-12-09 DIAGNOSIS — Z95.2 S/P TAVR (TRANSCATHETER AORTIC VALVE REPLACEMENT): Primary | ICD-10-CM

## 2024-12-09 PROCEDURE — 93798 PHYS/QHP OP CAR RHAB W/ECG: CPT

## 2024-12-11 ENCOUNTER — HOSPITAL ENCOUNTER (OUTPATIENT)
Dept: CARDIAC REHAB | Facility: CLINIC | Age: 84
Discharge: HOME | End: 2024-12-11
Attending: NURSE PRACTITIONER
Payer: COMMERCIAL

## 2024-12-11 PROCEDURE — 93798 PHYS/QHP OP CAR RHAB W/ECG: CPT

## 2024-12-16 ENCOUNTER — HOSPITAL ENCOUNTER (OUTPATIENT)
Dept: CARDIAC REHAB | Facility: CLINIC | Age: 84
Discharge: HOME | End: 2024-12-16
Attending: NURSE PRACTITIONER
Payer: COMMERCIAL

## 2024-12-16 PROCEDURE — 93798 PHYS/QHP OP CAR RHAB W/ECG: CPT

## 2025-01-07 ENCOUNTER — HOSPITAL ENCOUNTER (OUTPATIENT)
Dept: CARDIAC REHAB | Facility: CLINIC | Age: 85
Discharge: HOME | End: 2025-01-07
Attending: NURSE PRACTITIONER
Payer: COMMERCIAL

## 2025-01-07 PROCEDURE — 93798 PHYS/QHP OP CAR RHAB W/ECG: CPT

## 2025-01-09 ENCOUNTER — HOSPITAL ENCOUNTER (OUTPATIENT)
Dept: CARDIAC REHAB | Facility: CLINIC | Age: 85
Discharge: HOME | End: 2025-01-09
Attending: NURSE PRACTITIONER
Payer: COMMERCIAL

## 2025-01-09 PROCEDURE — 93798 PHYS/QHP OP CAR RHAB W/ECG: CPT

## 2025-01-21 ENCOUNTER — HOSPITAL ENCOUNTER (OUTPATIENT)
Dept: CARDIAC REHAB | Facility: CLINIC | Age: 85
Discharge: HOME | End: 2025-01-21
Attending: NURSE PRACTITIONER
Payer: COMMERCIAL

## 2025-01-21 PROCEDURE — 93798 PHYS/QHP OP CAR RHAB W/ECG: CPT

## 2025-01-30 ENCOUNTER — HOSPITAL ENCOUNTER (OUTPATIENT)
Dept: CARDIAC REHAB | Facility: CLINIC | Age: 85
Discharge: HOME | End: 2025-01-30
Attending: NURSE PRACTITIONER
Payer: COMMERCIAL

## 2025-01-30 PROCEDURE — 93798 PHYS/QHP OP CAR RHAB W/ECG: CPT

## 2025-02-06 ENCOUNTER — HOSPITAL ENCOUNTER (OUTPATIENT)
Dept: CARDIAC REHAB | Facility: CLINIC | Age: 85
Discharge: HOME | End: 2025-02-06
Attending: NURSE PRACTITIONER
Payer: COMMERCIAL

## 2025-02-06 PROCEDURE — 93798 PHYS/QHP OP CAR RHAB W/ECG: CPT

## 2025-02-11 ENCOUNTER — HOSPITAL ENCOUNTER (OUTPATIENT)
Dept: CARDIAC REHAB | Facility: CLINIC | Age: 85
Discharge: HOME | End: 2025-02-11
Attending: NURSE PRACTITIONER
Payer: COMMERCIAL

## 2025-02-11 PROCEDURE — 93798 PHYS/QHP OP CAR RHAB W/ECG: CPT

## 2025-02-18 ENCOUNTER — HOSPITAL ENCOUNTER (OUTPATIENT)
Dept: CARDIAC REHAB | Facility: CLINIC | Age: 85
Discharge: HOME | End: 2025-02-18
Attending: NURSE PRACTITIONER
Payer: COMMERCIAL

## 2025-02-18 PROCEDURE — 93798 PHYS/QHP OP CAR RHAB W/ECG: CPT

## 2025-02-25 ENCOUNTER — HOSPITAL ENCOUNTER (OUTPATIENT)
Dept: CARDIAC REHAB | Facility: CLINIC | Age: 85
Discharge: HOME | End: 2025-02-25
Attending: NURSE PRACTITIONER
Payer: COMMERCIAL

## 2025-02-25 PROCEDURE — 93798 PHYS/QHP OP CAR RHAB W/ECG: CPT

## 2025-03-04 ENCOUNTER — HOSPITAL ENCOUNTER (OUTPATIENT)
Dept: CARDIAC REHAB | Facility: CLINIC | Age: 85
Discharge: HOME | End: 2025-03-04
Attending: NURSE PRACTITIONER
Payer: COMMERCIAL

## 2025-03-04 PROCEDURE — 93798 PHYS/QHP OP CAR RHAB W/ECG: CPT

## 2025-03-06 ENCOUNTER — HOSPITAL ENCOUNTER (OUTPATIENT)
Dept: CARDIAC REHAB | Facility: CLINIC | Age: 85
Discharge: HOME | End: 2025-03-06
Attending: NURSE PRACTITIONER
Payer: COMMERCIAL

## 2025-03-06 PROCEDURE — 93798 PHYS/QHP OP CAR RHAB W/ECG: CPT

## 2025-05-14 ENCOUNTER — TELEPHONE (OUTPATIENT)
Dept: NURSING | Facility: CLINIC | Age: 85
End: 2025-05-14

## 2025-05-14 ENCOUNTER — HOSPITAL ENCOUNTER (EMERGENCY)
Facility: CLINIC | Age: 85
Discharge: HOME | End: 2025-05-14
Attending: EMERGENCY MEDICINE | Admitting: EMERGENCY MEDICINE
Payer: COMMERCIAL

## 2025-05-14 VITALS
SYSTOLIC BLOOD PRESSURE: 147 MMHG | TEMPERATURE: 97.6 F | HEART RATE: 81 BPM | OXYGEN SATURATION: 96 % | RESPIRATION RATE: 18 BRPM | BODY MASS INDEX: 37.39 KG/M2 | WEIGHT: 219 LBS | HEIGHT: 64 IN | DIASTOLIC BLOOD PRESSURE: 70 MMHG

## 2025-05-14 DIAGNOSIS — I51.7 CARDIOMEGALY: ICD-10-CM

## 2025-05-14 DIAGNOSIS — R60.0 BILATERAL LOWER EXTREMITY EDEMA: ICD-10-CM

## 2025-05-14 DIAGNOSIS — I50.1 PULMONARY EDEMA CARDIAC CAUSE (H): ICD-10-CM

## 2025-05-14 LAB
ANION GAP SERPL CALCULATED.3IONS-SCNC: 10 MMOL/L (ref 7–15)
ATRIAL RATE - MUSE: 96 BPM
BASE EXCESS BLDV CALC-SCNC: -2.2 MMOL/L (ref -3–3)
BASOPHILS # BLD AUTO: 0.1 10E3/UL (ref 0–0.2)
BASOPHILS NFR BLD AUTO: 1 %
BUN SERPL-MCNC: 19.8 MG/DL (ref 8–23)
CALCIUM SERPL-MCNC: 8.2 MG/DL (ref 8.8–10.4)
CHLORIDE SERPL-SCNC: 110 MMOL/L (ref 98–107)
CREAT SERPL-MCNC: 1.01 MG/DL (ref 0.67–1.17)
DIASTOLIC BLOOD PRESSURE - MUSE: NORMAL MMHG
EGFRCR SERPLBLD CKD-EPI 2021: 73 ML/MIN/1.73M2
EOSINOPHIL # BLD AUTO: 0.3 10E3/UL (ref 0–0.7)
EOSINOPHIL NFR BLD AUTO: 4 %
ERYTHROCYTE [DISTWIDTH] IN BLOOD BY AUTOMATED COUNT: 14.6 % (ref 10–15)
GLUCOSE SERPL-MCNC: 99 MG/DL (ref 70–99)
HCO3 BLDV-SCNC: 23 MMOL/L (ref 21–28)
HCO3 SERPL-SCNC: 18 MMOL/L (ref 22–29)
HCT VFR BLD AUTO: 41.2 % (ref 40–53)
HGB BLD-MCNC: 14.1 G/DL (ref 13.3–17.7)
IMM GRANULOCYTES # BLD: 0 10E3/UL
IMM GRANULOCYTES NFR BLD: 0 %
INR PPP: 1.74 (ref 0.85–1.15)
INTERPRETATION ECG - MUSE: NORMAL
LYMPHOCYTES # BLD AUTO: 1.6 10E3/UL (ref 0.8–5.3)
LYMPHOCYTES NFR BLD AUTO: 26 %
MCH RBC QN AUTO: 33.5 PG (ref 26.5–33)
MCHC RBC AUTO-ENTMCNC: 34.2 G/DL (ref 31.5–36.5)
MCV RBC AUTO: 98 FL (ref 78–100)
MONOCYTES # BLD AUTO: 0.6 10E3/UL (ref 0–1.3)
MONOCYTES NFR BLD AUTO: 10 %
NEUTROPHILS # BLD AUTO: 3.6 10E3/UL (ref 1.6–8.3)
NEUTROPHILS NFR BLD AUTO: 59 %
NRBC # BLD AUTO: 0 10E3/UL
NRBC BLD AUTO-RTO: 0 /100
NT-PROBNP SERPL-MCNC: 298 PG/ML (ref 0–852)
O2/TOTAL GAS SETTING VFR VENT: 28 %
OXYHGB MFR BLDV: 76 % (ref 70–75)
P AXIS - MUSE: NORMAL DEGREES
PCO2 BLDV: 38 MM HG (ref 40–50)
PH BLDV: 7.38 [PH] (ref 7.32–7.43)
PLATELET # BLD AUTO: 189 10E3/UL (ref 150–450)
PO2 BLDV: 43 MM HG (ref 25–47)
POTASSIUM SERPL-SCNC: 4.7 MMOL/L (ref 3.4–5.3)
PR INTERVAL - MUSE: NORMAL MS
PROTHROMBIN TIME: 20.4 SECONDS (ref 11.8–14.8)
QRS DURATION - MUSE: 82 MS
QT - MUSE: 390 MS
QTC - MUSE: 435 MS
R AXIS - MUSE: 94 DEGREES
RBC # BLD AUTO: 4.21 10E6/UL (ref 4.4–5.9)
SAO2 % BLDV: 76.8 % (ref 70–75)
SODIUM SERPL-SCNC: 138 MMOL/L (ref 135–145)
SYSTOLIC BLOOD PRESSURE - MUSE: NORMAL MMHG
T AXIS - MUSE: 24 DEGREES
TROPONIN T SERPL HS-MCNC: 12 NG/L
TROPONIN T SERPL HS-MCNC: 15 NG/L
VENTRICULAR RATE- MUSE: 75 BPM
WBC # BLD AUTO: 6.1 10E3/UL (ref 4–11)

## 2025-05-14 PROCEDURE — 36415 COLL VENOUS BLD VENIPUNCTURE: CPT | Performed by: EMERGENCY MEDICINE

## 2025-05-14 PROCEDURE — 85025 COMPLETE CBC W/AUTO DIFF WBC: CPT | Performed by: EMERGENCY MEDICINE

## 2025-05-14 PROCEDURE — 93005 ELECTROCARDIOGRAM TRACING: CPT | Performed by: EMERGENCY MEDICINE

## 2025-05-14 PROCEDURE — 80048 BASIC METABOLIC PNL TOTAL CA: CPT | Performed by: EMERGENCY MEDICINE

## 2025-05-14 PROCEDURE — 85610 PROTHROMBIN TIME: CPT | Performed by: EMERGENCY MEDICINE

## 2025-05-14 PROCEDURE — 83880 ASSAY OF NATRIURETIC PEPTIDE: CPT | Performed by: EMERGENCY MEDICINE

## 2025-05-14 PROCEDURE — 99285 EMERGENCY DEPT VISIT HI MDM: CPT | Mod: 25

## 2025-05-14 PROCEDURE — 84484 ASSAY OF TROPONIN QUANT: CPT | Performed by: EMERGENCY MEDICINE

## 2025-05-14 PROCEDURE — 82805 BLOOD GASES W/O2 SATURATION: CPT | Performed by: EMERGENCY MEDICINE

## 2025-05-14 RX ORDER — FUROSEMIDE 20 MG/1
20 TABLET ORAL DAILY
Qty: 10 TABLET | Refills: 0 | Status: SHIPPED | OUTPATIENT
Start: 2025-05-14 | End: 2025-05-24

## 2025-05-14 ASSESSMENT — ACTIVITIES OF DAILY LIVING (ADL)
ADLS_ACUITY_SCORE: 41

## 2025-05-14 ASSESSMENT — COLUMBIA-SUICIDE SEVERITY RATING SCALE - C-SSRS
6. HAVE YOU EVER DONE ANYTHING, STARTED TO DO ANYTHING, OR PREPARED TO DO ANYTHING TO END YOUR LIFE?: NO
1. IN THE PAST MONTH, HAVE YOU WISHED YOU WERE DEAD OR WISHED YOU COULD GO TO SLEEP AND NOT WAKE UP?: NO
2. HAVE YOU ACTUALLY HAD ANY THOUGHTS OF KILLING YOURSELF IN THE PAST MONTH?: NO

## 2025-05-14 NOTE — DISCHARGE INSTRUCTIONS
As we discussed in the ER, the fluid in your legs is likely back up from a mild component of heart failure.  We recommend diuresis which involves use of a water pill to help express some of that extra fluid.  Recommend stockings for compression is much as possible during the day and elevation of your legs at night.  Taking the furosemide or water pill in the morning to clear some of that excess fluid through the kidneys and urination.  You will need to follow-up with your cardiologist within the next week for recheck of fluid buildup and swelling as well as echocardiogram likely.

## 2025-05-14 NOTE — ED PROVIDER NOTES
NAME: Tyrel Durbin  AGE: 85 year old male  YOB: 1940  MRN: [id number]  EVALUATION DATE & TIME: No admission date for patient encounter.    PCP: Steve Fuentes    ED PROVIDER: Osvaldo Khan M.D.      Chief Complaint   Patient presents with    Rash     BLE     FINAL IMPRESSION:  1. Bilateral lower extremity edema    2. Cardiomegaly    3. Pulmonary edema cardiac cause (H)      MEDICAL DECISION MAKIN:31 AM I met with the patient, obtained history, performed an initial exam, and discussed options and plan for diagnostics and treatment here in the ED.   4:04 AM I rechecked and updated the patient on results. He does not wish to be admitted.  We discussed options for treatment including outpatient diuretics and close follow-up with his cardiology team at [facility].  4:30 AM repeat troponin is negative.  Patient cleared for discharge and will start diuretics in the morning.    Patient was clinically assessed and consented to treatment. After assessment, medical decision making and workup were discussed with the patient. The patient was agreeable to plan for testing, workup, and treatment.  Pertinent Labs & Imaging studies reviewed. (See chart for details)     Medical Decision Making  I reviewed the EMR: Outpatient Record: Recent cardiology visit from 2025 as well as outpatient primary care visits and dermatology visit from April of this year  Care impacted by Heart Disease  I independently interpreted the EKG and note no acute ischemia, chronic atrial fibrillation. See radiology report for final interpretation.  Discharge. I prescribed additional prescription strength medication(s) as charted. See documentation for any additional details.    MIPS (CTPE, Dental pain, Alcantar, Sinusitis, Asthma/COPD, Head Trauma): Not Applicable    SEPSIS: None    Tyrel Durbin is a 85 year old male who presents with rash and swelling in ankle.   Differential diagnosis includes but not limited to  cellulitis, dependent edema, peripheral edema, CHF, acute kidney failure, venous stasis.  Patient is an 85-year-old male with history of heart disease and presenting for rash.  Patient has seen dermatology and found to have small blisters or bullae along with leg swelling which likely is related to edema and not any dermatologic condition.  Patient's weeping is likely related to peripheral edema and possibly related to kidney or heart disease.  Presently patient does not appear to be in any distress but does report ongoing dyspnea which actually improved after his recent TAVR.  Patient's chart review showed some chronic edema noted in primary care visits last month but going back to January cardiology visit there was not any noted any edema on exam.  Patient I discussed his cardiac workup and EKG was done which showed chronic atrial fibrillation.  Patient reports he is in atrial fibrillation most of the time and on blood thinners for it.  I did discuss with him whether he is on diuretics which he is not.  Labs were obtained and showed normal BNP, unremarkable metabolic panel and stable CBC.  Troponin was negative as well and I do not believe this is acute but rather likely building up mild heart failure possibly related to the atrial fibrillation though it does appear presently he is rate controlled.  Patient is not hypoxic nor hypercapnic on VBG.  Patient appears otherwise comfortable and ultrasound was obtained as well which ruled out DVTs in the lower extremity.  I did also perform a chest x-ray which did show some mild cardiomegaly and slight pulmonary vascular congestion which would fit with his suspected peripheral edema related to cardiac congestive heart failure.  I spoke with patient about this and discussed diuresis which she did not wish to remain in the hospital.  Given that his vital signs are stable and oxygenation is normal it would be an option to try outpatient diuresis and start him on Lasix  low-dose once a day to see if this helps.  We discussed trying this starting in the morning and following up closely with his heart care clinic at [facility].  Patient would opt for this as opposed to admission and starting diuresis here.  I would recommend he had an outpatient echocardiogram and possibly continue diuresis if this is improving as well as compression stockings and elevation as discussed in the discharge.  Patient comfortable with this plan and will be discharged.    0 minutes of critical care time    MEDICATIONS GIVEN IN THE EMERGENCY:  Medications - No data to display    NEW PRESCRIPTIONS STARTED AT TODAY'S ER VISIT:  New Prescriptions    FUROSEMIDE (LASIX) 20 MG TABLET    Take 1 tablet (20 mg) by mouth daily for 10 days.          =================================================================    HPI    Patient information was obtained from: patient    Use of : N/A        Tyrel Durbin is a 85 year old male with a past medical history of skin cancer, who presents with a rash.    For 1 month patient has noticed swelling in legs. He saw derm in [place], [place] a couple weeks and was told to get compression stockings because he started getting clear weeping blisters. Later last the blisters were hurting. No redness or pus type drainage. History of cardiac disease with a-fib and his shortness of breath has improved after valve replacement.     Chart Review:  - [facility] on 4/17/25 (~1 month ago). Discussed etiology of this chronic condition related to venous insufficiency and need for treatment to prevent ulcers and infections in the skin. Discussed treatment options as well as their side effects.   -Initiate compression stockings (medium compression) to be worn daily during AM, at least 6 hours during daytime.  -Recommend leg elevation 2-3 times daily at or above heart level for 10-15 min each.   - Recommend follow-up with cardiology or primary care regarding new onset LE  edema - patient says he intends to see cardiology shortly  - Discussed option of topical steroid. Patient prefers to start with OTC hydrocortisone ointment     REVIEW OF SYSTEMS   Review of Systems     PAST MEDICAL HISTORY:  Past Medical History:   Diagnosis Date    Chronic atrial fibrillation (H)     Sleep apnea        PAST SURGICAL HISTORY:  Past Surgical History:   Procedure Laterality Date    AJCC COLON CANCER, STAGE I DOCUMENTED      COLONOSCOPY N/A 1/22/2024    Procedure: COLONOSCOPY with polypectomy x12;  Surgeon: Kevin Keller MD;  Location: [facility] Main OR       CURRENT MEDICATIONS:    No current facility-administered medications for this encounter.    Current Outpatient Medications:     furosemide (LASIX) 20 MG tablet, Take 1 tablet (20 mg) by mouth daily for 10 days., Disp: 10 tablet, Rfl: 0    cetirizine (ZYRTEC) 10 MG tablet, [CETIRIZINE (ZYRTEC) 10 MG TABLET] Take 10 mg by mouth daily., Disp: , Rfl:     co-enzyme Q-10 30 mg capsule, [CO-ENZYME Q-10 30 MG CAPSULE] Take 30 mg by mouth daily., Disp: , Rfl:     fish oil-omega-3 fatty acids (FISH OIL) 300-1,000 mg capsule, [FISH OIL-OMEGA-3 FATTY ACIDS (FISH OIL) 300-1,000 MG CAPSULE] Take 1 g by mouth daily., Disp: , Rfl:     GLUTATHIONE ORAL, [GLUTATHIONE ORAL] Take 1 tablet by mouth daily., Disp: , Rfl:     magnesium oxide (MAG-OX) 200 mg, [MAGNESIUM OXIDE (MAG-OX) 200 MG] Take 200 mg by mouth daily., Disp: , Rfl:     metoprolol succinate ER (TOPROL XL) 25 MG 24 hr tablet, Take 1 tablet by mouth daily, Disp: , Rfl:     multivitamin therapeutic tablet, [MULTIVITAMIN THERAPEUTIC TABLET] Take 1 tablet by mouth daily., Disp: , Rfl:     tamsulosin (FLOMAX) 0.4 MG capsule, TAKE 1 CAPSULE(0.4 MG) BY MOUTH EVERY DAY AFTER A MEAL, Disp: , Rfl:     warfarin ANTICOAGULANT (COUMADIN/JANTOVEN) 5 MG tablet, [WARFARIN ANTICOAGULANT (COUMADIN/JANTOVEN) 5 MG TABLET] Take 7-8 mg by mouth See Admin Instructions. 7 mg on Mondays, Wednesdays, and Fridays;  8mg on  "Tuesdays, Thursdays, Saturdays, Sundays.  For afib, goal inr 2-3, Disp: , Rfl:     ALLERGIES:  Allergies   Allergen Reactions    Pollen Extract Itching     Dust, trees in spring       FAMILY HISTORY:  No family history on file.    SOCIAL HISTORY:   Social History     Socioeconomic History    Marital status:    Vaping Use    Vaping status: Never Used   Substance and Sexual Activity    Alcohol use: Yes     Comment: 4 drinks per week    Drug use: Never     Social Drivers of Health     Financial Resource Strain: Low Risk  (8/8/2024)    [facility]    Financial Resource Strain     Difficulty of Paying Living Expenses: 3   Food Insecurity: No Food Insecurity (8/8/2024)    Received from [facility]    Food Insecurity     Do you worry your food will run out before you are able to buy more?: 1   Transportation Needs: No Transportation Needs (8/8/2024)    Received from [facility]    Transportation Needs     Does lack of transportation keep you from medical appointments?: 1     Does lack of transportation keep you from work, meetings or getting things that you need?: 1   Social Connections: Socially Integrated (8/8/2024)    Received from [facility]    Social Connections     Do you often feel lonely or isolated from those around you?: 0   Housing Stability: Low Risk  (8/8/2024)    Received from [facility]    Housing Stability     What is your housing situation today?: 1       PHYSICAL EXAM:    Vitals: BP (!) 147/70   Pulse 81   Temp 97.6  F (36.4  C) (Temporal)   Resp 18   Ht 1.626 m (5' 4\")   Wt 99.3 kg (219 lb)   SpO2 96%   BMI 37.59 kg/m     Physical Exam  Vitals and nursing note reviewed.   Constitutional:       General: He is not in acute distress.     Appearance: Normal appearance. He is obese. He is not ill-appearing or " toxic-appearing.   HENT:      Head: Normocephalic.   Neck:      Comments: No JVD noted  Cardiovascular:      Rate and Rhythm: Normal rate. Rhythm irregular.      Heart sounds: Normal heart sounds.   Pulmonary:      Effort: Pulmonary effort is normal. No respiratory distress.      Breath sounds: No stridor. Rales (Slight bilateral basilar crackle) present. No wheezing or rhonchi.   Chest:      Chest wall: No tenderness.   Abdominal:      General: Abdomen is flat.      Palpations: Abdomen is soft.      Tenderness: There is no abdominal tenderness.   Musculoskeletal:         General: No tenderness.      Cervical back: Normal range of motion.      Right lower leg: Edema (1+ lower extremity edema, clear blistering with weeping) present.      Left lower leg: Edema present.   Skin:     Findings: No bruising, erythema or rash.   Neurological:      General: No focal deficit present.      Mental Status: He is alert.   Psychiatric:         Behavior: Behavior normal.        LAB:  All pertinent labs reviewed and interpreted.  Labs Ordered and Resulted from Time of ED Arrival to Time of ED Departure   INR - Abnormal       Result Value    INR 1.74 (*)     PT 20.4 (*)    BASIC METABOLIC PANEL - Abnormal    Sodium 138      Potassium 4.7      Chloride 110 (*)     Carbon Dioxide (CO2) 18 (*)     Anion Gap 10      Urea Nitrogen 19.8      Creatinine 1.01      GFR Estimate 73      Calcium 8.2 (*)     Glucose 99     BLOOD GAS VENOUS - Abnormal    pH Venous 7.38      pCO2 Venous 38 (*)     pO2 Venous 43      Bicarbonate Venous 23      Base Excess/Deficit Venous -2.2      FIO2 28      Oxyhemoglobin Venous 76 (*)     O2 Sat, Venous 76.8 (*)    CBC WITH PLATELETS AND DIFFERENTIAL - Abnormal    WBC Count 6.1      RBC Count 4.21 (*)     Hemoglobin 14.1      Hematocrit 41.2      MCV 98      MCH 33.5 (*)     MCHC 34.2      RDW 14.6      Platelet Count 189      % Neutrophils 59      % Lymphocytes 26      % Monocytes 10      % Eosinophils 4      %  Basophils 1      % Immature Granulocytes 0      NRBCs per 100 WBC 0      Absolute Neutrophils 3.6      Absolute Lymphocytes 1.6      Absolute Monocytes 0.6      Absolute Eosinophils 0.3      Absolute Basophils 0.1      Absolute Immature Granulocytes 0.0      Absolute NRBCs 0.0     TROPONIN T, HIGH SENSITIVITY - Normal    Troponin T, High Sensitivity 15     NT-PROBNP - Normal    NT-proBNP 298     TROPONIN T, HIGH SENSITIVITY - Normal    Troponin T, High Sensitivity 12       RADIOLOGY:  XR Chest Port 1 View   Final Result   IMPRESSION: Right lower lobe calcified granuloma. Borderline enlarged heart with mild to moderate pulmonary vascular congestion. Query trace right pleural effusion. No pneumothorax. Degenerative changes of the thoracic spine. No acute osseous    abnormality.      US Lower Extremity Venous Duplex Bilateral   Final Result   IMPRESSION:   1.  No deep venous thrombosis in the bilateral lower extremities.        EKG:   Performed at: 14-May-2025 02:07:29  Impression: Atrial fibrillation with rate control, no signs acute ST elevation ischemia, no other ectopy.  Rate: 75 bpm  Rhythm: Atrial fibrillation  QRS Interval: 82 ms  QTc Interval: 435 ms  Comparison: No previous ECGs  I have independently reviewed and interpreted the EKG(s) documented above.     PROCEDURES:   Procedures     I, Jarrell Munoz, am serving as a scribe to document services personally performed by Dr. Osvaldo Khan  based on my observation and the provider's statements to me. I, Osvaldo Khan MD attest that Jarrell Munoz is acting in a scribe capacity, has observed my performance of the services and has documented them in accordance with my direction.    Osvaldo Khan M.D.  Emergency Medicine  [facility] Emergency Department       Osvaldo Khan MD  05/14/25 9400

## 2025-05-14 NOTE — ED TRIAGE NOTES
Patient presents to the ED complaining of a weeping rash to his bilateral lower extremities for the last two weeks.  He took a Percocet and 1000 mg of Tylenol around 0100.  Denies pain at this time.       Triage Assessment (Adult)       Row Name 05/14/25 0132          Triage Assessment    Airway WDL WDL        Respiratory WDL    Respiratory WDL WDL        Skin Circulation/Temperature WDL    Skin Circulation/Temperature WDL WDL        Cardiac WDL    Cardiac WDL WDL        Peripheral/Neurovascular WDL    Peripheral Neurovascular WDL WDL        Cognitive/Neuro/Behavioral WDL    Cognitive/Neuro/Behavioral WDL WDL

## 2025-05-14 NOTE — TELEPHONE ENCOUNTER
Tyrel called requesting the MD's name who saw him in the ED, per AVS Dr Khan was the attending. Denied need for anything else.    Maryam Capellan RN on 5/14/2025 at 9:24 AM

## 2025-06-03 ENCOUNTER — HOSPITAL ENCOUNTER (EMERGENCY)
Facility: CLINIC | Age: 85
Discharge: LEFT BEFORE DISCHARGE | End: 2025-06-03
Admitting: EMERGENCY MEDICINE
Payer: COMMERCIAL

## 2025-06-03 VITALS
WEIGHT: 223 LBS | RESPIRATION RATE: 16 BRPM | HEART RATE: 66 BPM | OXYGEN SATURATION: 98 % | BODY MASS INDEX: 38.28 KG/M2 | SYSTOLIC BLOOD PRESSURE: 146 MMHG | TEMPERATURE: 97 F | DIASTOLIC BLOOD PRESSURE: 66 MMHG

## 2025-06-03 PROCEDURE — 99281 EMR DPT VST MAYX REQ PHY/QHP: CPT

## 2025-06-03 ASSESSMENT — COLUMBIA-SUICIDE SEVERITY RATING SCALE - C-SSRS
1. IN THE PAST MONTH, HAVE YOU WISHED YOU WERE DEAD OR WISHED YOU COULD GO TO SLEEP AND NOT WAKE UP?: NO
2. HAVE YOU ACTUALLY HAD ANY THOUGHTS OF KILLING YOURSELF IN THE PAST MONTH?: NO
6. HAVE YOU EVER DONE ANYTHING, STARTED TO DO ANYTHING, OR PREPARED TO DO ANYTHING TO END YOUR LIFE?: NO

## 2025-06-03 NOTE — ED TRIAGE NOTES
Pt arrives to ED with c/o ongoing bilateral leg swelling pain and wounds. Pt states left leg wound has been weeping. Right wound is getting bigger. Pt has knee replacement coming up on the 17th and wants to get these taken care of before his surgery so he can still have surgery.      Triage Assessment (Adult)       Row Name 06/03/25 1328          Triage Assessment    Airway WDL WDL        Respiratory WDL    Respiratory WDL WDL        Skin Circulation/Temperature WDL    Skin Circulation/Temperature WDL X  wounds        Cardiac WDL    Cardiac WDL WDL        Peripheral/Neurovascular WDL    Peripheral Neurovascular WDL WDL        Cognitive/Neuro/Behavioral WDL    Cognitive/Neuro/Behavioral WDL WDL                      Statement Selected

## 2025-06-04 ENCOUNTER — HOSPITAL ENCOUNTER (EMERGENCY)
Facility: CLINIC | Age: 85
Discharge: HOME | End: 2025-06-04
Attending: EMERGENCY MEDICINE | Admitting: EMERGENCY MEDICINE
Payer: COMMERCIAL

## 2025-06-04 VITALS
RESPIRATION RATE: 22 BRPM | DIASTOLIC BLOOD PRESSURE: 72 MMHG | WEIGHT: 230 LBS | HEIGHT: 64 IN | TEMPERATURE: 98.3 F | HEART RATE: 78 BPM | OXYGEN SATURATION: 97 % | BODY MASS INDEX: 39.27 KG/M2 | SYSTOLIC BLOOD PRESSURE: 145 MMHG

## 2025-06-04 DIAGNOSIS — L03.115 CELLULITIS OF RIGHT LOWER LEG: ICD-10-CM

## 2025-06-04 DIAGNOSIS — R60.0 PERIPHERAL EDEMA: ICD-10-CM

## 2025-06-04 DIAGNOSIS — Z79.01 WARFARIN ANTICOAGULATION: ICD-10-CM

## 2025-06-04 PROCEDURE — 250N000013 HC RX MED GY IP 250 OP 250 PS 637: Performed by: EMERGENCY MEDICINE

## 2025-06-04 PROCEDURE — 99284 EMERGENCY DEPT VISIT MOD MDM: CPT

## 2025-06-04 RX ORDER — FUROSEMIDE 20 MG/1
20 TABLET ORAL DAILY
Qty: 14 TABLET | Refills: 0 | Status: SHIPPED | OUTPATIENT
Start: 2025-06-05 | End: 2025-06-19

## 2025-06-04 RX ORDER — CLINDAMYCIN HYDROCHLORIDE 150 MG/1
450 CAPSULE ORAL ONCE
Status: COMPLETED | OUTPATIENT
Start: 2025-06-04 | End: 2025-06-04

## 2025-06-04 RX ORDER — CLINDAMYCIN HYDROCHLORIDE 150 MG/1
450 CAPSULE ORAL 3 TIMES DAILY
Qty: 90 CAPSULE | Refills: 0 | Status: SHIPPED | OUTPATIENT
Start: 2025-06-04 | End: 2025-06-14

## 2025-06-04 RX ORDER — CEFDINIR 300 MG/1
300 CAPSULE ORAL ONCE
Status: COMPLETED | OUTPATIENT
Start: 2025-06-04 | End: 2025-06-04

## 2025-06-04 RX ORDER — FUROSEMIDE 20 MG/1
40 TABLET ORAL ONCE
Status: COMPLETED | OUTPATIENT
Start: 2025-06-04 | End: 2025-06-04

## 2025-06-04 RX ORDER — CEFDINIR 300 MG/1
300 CAPSULE ORAL 2 TIMES DAILY
Qty: 20 CAPSULE | Refills: 0 | Status: SHIPPED | OUTPATIENT
Start: 2025-06-04 | End: 2025-06-14

## 2025-06-04 RX ADMIN — FUROSEMIDE 40 MG: 20 TABLET ORAL at 07:38

## 2025-06-04 RX ADMIN — CLINDAMYCIN HYDROCHLORIDE 450 MG: 150 CAPSULE ORAL at 07:37

## 2025-06-04 RX ADMIN — CEFDINIR 300 MG: 300 CAPSULE ORAL at 07:38

## 2025-06-04 ASSESSMENT — COLUMBIA-SUICIDE SEVERITY RATING SCALE - C-SSRS
2. HAVE YOU ACTUALLY HAD ANY THOUGHTS OF KILLING YOURSELF IN THE PAST MONTH?: NO
1. IN THE PAST MONTH, HAVE YOU WISHED YOU WERE DEAD OR WISHED YOU COULD GO TO SLEEP AND NOT WAKE UP?: NO
6. HAVE YOU EVER DONE ANYTHING, STARTED TO DO ANYTHING, OR PREPARED TO DO ANYTHING TO END YOUR LIFE?: NO

## 2025-06-04 NOTE — ED PROVIDER NOTES
"EMERGENCY DEPARTMENT ENCOUNTER      NAME: Tyrel Durbin  AGE: 85 year old male  YOB: 1940  MRN: [id number]  EVALUATION DATE & TIME: No admission date for patient encounter.    PCP: Steve Fuentes    ED PROVIDER: Evans Sousa M.D.      Chief Complaint   Patient presents with    Wound Check         IMPRESSION  1. Cellulitis of right lower leg    2. Peripheral edema    3. Warfarin anticoagulation        PLAN  - cefdinir 300mg q12h & clindamycin 450mg TID x10 days  - increase Lasix from 20mg to 40mg daily  - close PCP follow up  - discharge to home    ED COURSE & MEDICAL DECISION MAKING    ED Course as of 06/04/25 0743   Wed Jun 04, 2025   0717 Patient seen in the waiting room due to boarding crisis.   0733 85yoM with history of chronic a-fib (takes warfarin), HLD presenting for evaluation of right lower leg redness & pain. Was seen in the ED 3 weeks ago with peripheral edema and chronic mild dyspnea: BLE US negative for DVT, CXR with mild pulmonary edema but BNP within normal limits, labs otherwise unremarkable. Started on Lasix 20mg daily and discharge home. States he saw his cardiologist in follow up who continued the Lasix going forward. States his BLE swelling is ongoing; unsure if better, worse, or same. Has been wearing compression stockings and urinating as expected with the Lasix. States his shortness of breath is mildly improved; no chest pain. The past couple days, has noticed new erythema & pain to his right posterolateral calf near a chronic mild ulceration. No purulence, blistering, fevers, sweats, chills. Concerned about infection; thus came to the ED. States he is scheduled for right knee replacement in 2 weeks and wants any infection cleared before then so this is not cancelled. Notes he has had chronic small scabbed ulcerations to both lower legs \"for a long, long time\".    Normal vitals on presentation. Exam with 2+ nontender pitting edema up to knees bilaterally, RLE with about " 1cm diameter scabbed ulceration to mid posterolateral aspect with about 4cm surrounding area of bright tender erythema, no purulence/blistering/crepitus or pain with ROM of knee/ankle with distal CMS intact. LLE with about 1cm diameter scabbed ulceration to mid anterior aspect with no erythema or tenderness. Otherwise has clear lungs, normal work of breathing, benign abdomen, clear sharp mentation.   0733 Has mild cellulitis of RLE likely related to his chronic ulceration and underlying edema. No concern for necrotizing fasciitis, sepsis, septic joint. Patient comfortable not obtaining blood tests or imaging here now which I agree with. Given 1st dose of antibiotics here in the ED and prescriptions for home. Will also increase his previously-prescribed Lasix to 40mg daily for the next 2 weeks. Patient able to tolerate PO and walk without difficulty. Patient comfortable with discharge at this time. Return precautions and need for PCP follow up discussed and understood. No further questions at the time of discharge.       --------------------------------------------------------------------------------   --------------------------------------------------------------------------------   ED course timestamps entered by medical scribe:  7:18 AM I met with the patient for the initial interview and physical examination. Discussed plan for treatment and workup in the ED.  7:33 AM We discussed the plan for discharge and the patient is agreeable. Reviewed supportive cares, symptomatic treatment, outpatient follow up, and reasons to return to the Emergency Department. All questions and concerns were addressed. Patient to be discharged by ED RN.      --------------------------------------------------------------------------------  --------------------------------------------------------------------------------   This patient involved a high degree of complexity in medical decision making, as significant risks were present and  assessed. Recent encounters & results in medical record reviewed by me.    All workup (i.e. any EKG/labs/imaging as per charting below) reviewed and independently interpreted by me. See respective sections for details.    Broad differential considered for this patient, including but not limited to:  cellulitis, necrotizing fasciitis, sepsis, septic joint, myositis, compartment syndrome, DVT, bacteremia, CHF, hypothyroidism, renal failure, liver failure      See additional MDM below if interested.      MEDICATIONS GIVEN IN THE EMERGENCY DEPARTMENT  Medications   furosemide (LASIX) tablet 40 mg (40 mg Oral $Given 6/4/25 0738)   cefdinir (OMNICEF) capsule 300 mg (300 mg Oral $Given 6/4/25 0738)   clindamycin (CLEOCIN) capsule 450 mg (450 mg Oral $Given 6/4/25 0737)       NEW PRESCRIPTIONS STARTED AT TODAY'S ER VISIT  Discharge Medication List as of 6/4/2025  7:35 AM        START taking these medications    Details   cefdinir (OMNICEF) 300 MG capsule Take 1 capsule (300 mg) by mouth 2 times daily for 10 days., Disp-20 capsule, R-0, E-Prescribe      clindamycin (CLEOCIN) 150 MG capsule Take 3 capsules (450 mg) by mouth 3 times daily for 10 days., Disp-90 capsule, R-0, E-Prescribe           CONTINUE these medications which have CHANGED    Details   furosemide (LASIX) 20 MG tablet Take 1 tablet (20 mg) by mouth daily for 14 days., Disp-14 tablet, R-0, E-Prescribe           CONTINUE these medications which have NOT CHANGED    Details   cetirizine (ZYRTEC) 10 MG tablet [CETIRIZINE (ZYRTEC) 10 MG TABLET] Take 10 mg by mouth daily., Historical      co-enzyme Q-10 30 mg capsule [CO-ENZYME Q-10 30 MG CAPSULE] Take 30 mg by mouth daily., Historical      fish oil-omega-3 fatty acids (FISH OIL) 300-1,000 mg capsule [FISH OIL-OMEGA-3 FATTY ACIDS (FISH OIL) 300-1,000 MG CAPSULE] Take 1 g by mouth daily., Historical      GLUTATHIONE ORAL [GLUTATHIONE ORAL] Take 1 tablet by mouth daily., Historical      magnesium oxide (MAG-OX) 200 mg  "[MAGNESIUM OXIDE (MAG-OX) 200 MG] Take 200 mg by mouth daily., Historical      metoprolol succinate ER (TOPROL XL) 25 MG 24 hr tablet Take 1 tablet by mouth daily, Historical      multivitamin therapeutic tablet [MULTIVITAMIN THERAPEUTIC TABLET] Take 1 tablet by mouth daily., Historical      tamsulosin (FLOMAX) 0.4 MG capsule TAKE 1 CAPSULE(0.4 MG) BY MOUTH EVERY DAY AFTER A MEAL, Historical      warfarin ANTICOAGULANT (COUMADIN/JANTOVEN) 5 MG tablet [WARFARIN ANTICOAGULANT (COUMADIN/JANTOVEN) 5 MG TABLET] Take 7-8 mg by mouth See Admin Instructions. 7 mg on Mondays, Wednesdays, and Fridays;  8mg on Tuesdays, Thursdays, Saturdays, Sundays.  For afib, goal inr 2-3, Historical                 =================================================================      HPI  Use of : N/A        Tyrel Durbin is a 85 year old male with a pertinent history of chronic atrial fibrillation who presents to this ED via walk-in for evaluation of a wound check.     Patient has had an ulcer on his lower left shin for 3 weeks now and it has been weeping. He also has a pinching sensation in the back of his right lower calf where he has another ulcer. He was here 3 weeks ago and Dr. Khan discovered leg edema and told the patient to begin wearing compression socks. He is unsure if his edema has improved since. He recently saw his cardiologist who did not say anything about the ulcers on his legs. He does take a diuretic and has been urinating. He is \"very much so\" short of breath but this is not any worse than it was 3 weeks ago. He is concerned because he has a right TKA on 6/17 and is worried the ulcers on his right leg may affect this surgery date.     Chart Review:  - [facility] on 5/14/25 (~3 weeks ago). Labs were obtained and showed normal BNP, unremarkable metabolic panel and stable CBC. Troponin was negative. Ultrasound was obtained as well which ruled out DVTs in the lower extremity. Chest x-ray which did show some " mild cardiomegaly and slight pulmonary vascular congestion which would fit with his suspected peripheral edema related to cardiac congestive heart failure. We discussed trying this starting in the morning and following up closely with his heart care clinic at [facility]. I would recommend he had an outpatient echocardiogram and possibly continue diuresis if this is improving as well as compression stockings and elevation as discussed in the discharge.       --------------- MEDICAL HISTORY ---------------  PAST MEDICAL HISTORY:  Reviewed independently by me.  Past Medical History:   Diagnosis Date    Chronic atrial fibrillation (H)     Sleep apnea      Patient Active Problem List   Diagnosis    COVID-19    Generalized muscle weakness    Paroxysmal A-fib (H)    Actinic keratosis    Anticoagulation monitoring, INR range 2-3    Cancer of skin    Colon polyp    Dyslipidemia    Right lower quadrant abdominal pain       PAST SURGICAL HISTORY:  Reviewed independently by me.  Past Surgical History:   Procedure Laterality Date    AJCC COLON CANCER, STAGE I DOCUMENTED      COLONOSCOPY N/A 1/22/2024    Procedure: COLONOSCOPY with polypectomy x12;  Surgeon: Kevin Keller MD;  Location: [facility] Main OR       CURRENT MEDICATIONS:    Reviewed independently by me.  No current facility-administered medications for this encounter.    Current Outpatient Medications:     cefdinir (OMNICEF) 300 MG capsule, Take 1 capsule (300 mg) by mouth 2 times daily for 10 days., Disp: 20 capsule, Rfl: 0    clindamycin (CLEOCIN) 150 MG capsule, Take 3 capsules (450 mg) by mouth 3 times daily for 10 days., Disp: 90 capsule, Rfl: 0    [START ON 6/5/2025] furosemide (LASIX) 20 MG tablet, Take 1 tablet (20 mg) by mouth daily for 14 days., Disp: 14 tablet, Rfl: 0    cetirizine (ZYRTEC) 10 MG tablet, [CETIRIZINE (ZYRTEC) 10 MG TABLET] Take 10 mg by mouth daily., Disp: , Rfl:     co-enzyme Q-10 30 mg capsule, [CO-ENZYME Q-10 30 MG CAPSULE] Take 30 mg by  mouth daily., Disp: , Rfl:     fish oil-omega-3 fatty acids (FISH OIL) 300-1,000 mg capsule, [FISH OIL-OMEGA-3 FATTY ACIDS (FISH OIL) 300-1,000 MG CAPSULE] Take 1 g by mouth daily., Disp: , Rfl:     furosemide (LASIX) 20 MG tablet, Take 1 tablet (20 mg) by mouth daily for 10 days., Disp: 10 tablet, Rfl: 0    GLUTATHIONE ORAL, [GLUTATHIONE ORAL] Take 1 tablet by mouth daily., Disp: , Rfl:     magnesium oxide (MAG-OX) 200 mg, [MAGNESIUM OXIDE (MAG-OX) 200 MG] Take 200 mg by mouth daily., Disp: , Rfl:     metoprolol succinate ER (TOPROL XL) 25 MG 24 hr tablet, Take 1 tablet by mouth daily, Disp: , Rfl:     multivitamin therapeutic tablet, [MULTIVITAMIN THERAPEUTIC TABLET] Take 1 tablet by mouth daily., Disp: , Rfl:     tamsulosin (FLOMAX) 0.4 MG capsule, TAKE 1 CAPSULE(0.4 MG) BY MOUTH EVERY DAY AFTER A MEAL, Disp: , Rfl:     warfarin ANTICOAGULANT (COUMADIN/JANTOVEN) 5 MG tablet, [WARFARIN ANTICOAGULANT (COUMADIN/JANTOVEN) 5 MG TABLET] Take 7-8 mg by mouth See Admin Instructions. 7 mg on Mondays, Wednesdays, and Fridays;  8mg on Tuesdays, Thursdays, Saturdays, Sundays.  For afib, goal inr 2-3, Disp: , Rfl:     ALLERGIES:  Reviewed independently by me.  Allergies   Allergen Reactions    Pollen Extract Itching     Dust, trees in spring       FAMILY HISTORY:  Reviewed independently by me.  No family history on file.      SOCIAL HISTORY:   Reviewed independently by me.  Social History     Socioeconomic History    Marital status:    Vaping Use    Vaping status: Never Used   Substance and Sexual Activity    Alcohol use: Yes     Comment: 4 drinks per week    Drug use: Never     Social Drivers of Health     Financial Resource Strain: Low Risk  (8/8/2024)    Received from [facility] [employer]    Financial Resource Strain     Difficulty of Paying Living Expenses: 3   Food Insecurity: No Food Insecurity (8/8/2024)    [facility]    Food Insecurity      "Do you worry your food will run out before you are able to buy more?: 1   Transportation Needs: No Transportation Needs (8/8/2024)    [facility]    Transportation Needs     Does lack of transportation keep you from medical appointments?: 1     Does lack of transportation keep you from work, meetings or getting things that you need?: 1   Social Connections: Socially Integrated (8/8/2024)    [facility]    Social Connections     Do you often feel lonely or isolated from those around you?: 0   Housing Stability: Low Risk  (8/8/2024)    Received from [facility]    Housing Stability     What is your housing situation today?: 1       --------------- PHYSICAL EXAM ---------------  Nursing notes and vitals independently reviewed by me.  VITALS:  Vitals:    06/04/25 0717   BP: (!) 145/72   Pulse: 78   Resp: 22   Temp: 98.3  F (36.8  C)   TempSrc: Oral   SpO2: 97%   Weight: 104.3 kg (230 lb)   Height: 1.626 m (5' 4\")       PHYSICAL EXAM:    General:  alert, interactive, no distress  Eyes:  conjunctivae clear, conjugate gaze  HENT:  atraumatic, nose with no rhinorrhea, oropharynx clear  Neck:  no meningismus  Cardiovascular:  HR 80s during exam, regular rhythm, no murmurs, brisk cap refill  Chest:  no chest wall tenderness  Pulmonary:  no stridor, normal phonation, normal work of breathing, clear lungs bilaterally  Abdomen:  soft, nondistended, nontender  :  no CVA tenderness  Back:  no midline spinal tenderness  Musculoskeletal:  2+ nontender pitting edema up to knees bilaterally, RLE with about 1cm diameter scabbed ulceration to mid posterolateral aspect with about 4cm surrounding area of bright tender erythema, no purulence/blistering/crepitus or pain with ROM of knee/ankle with distal CMS intact. LLE with about 1cm diameter scabbed ulceration to mid anterior aspect with no erythema or " tenderness  Skin:  warm, dry  Neuro:  awake, alert, answers questions appropriately, follows commands, moves all limbs  Psych:  calm, normal affect                    --------------- ADDITIONAL MDM ---------------  Severe Sepsis/Septic Shock/STEMI/Stroke Measures:  None    MIPS (CTPE, dental pain, Alcantar, sinusitis, asthma/COPD, head trauma):  Not Applicable    History:  - I considered systemic symptoms of the presenting illness.  - Supplemental history from:       -- patient  - External Record(s) reviewed:       -- Inpatient/outpatient record (clinic visit 5/15/25), prior labs (blood 5/14/25), prior imaging (BLE US & CXR 5/14/25)       -- see above ED course & MDM for further details    Workup:  - Chart documentation above includes differential considered and my independent interpretation any EKGs, labs tests, and/or imaging  - emergent/severe conditions considered and evaluated for: see above differential & MDM  - In additional to work up documented, I considered the following work up:       -- blood, BLE US, CXR       -- see above ED course & MDM for further details    Independent Interpretation:  - Independent interpretation of ECG and images noted in documentation, when applicable.    External Consultation:  - Discussion of management with another provider:       -- ED pharmacist re: meds       -- see above ED course & MDM for additional    Complicating Factors:  - Care impacted by chronic illness:       -- see above MDM, past medical history, & problem list    Disposition Considerations:  - Discharge       -- I considered escalation of care with admission to the hospital, but ultimately discharged the patient given reassuring exam, comfortable with discharge       -- I recommended the patient continue their current prescription strength medication(s) as charted above in current medications list       -- I prescribed prescription strength medication(s) as charted above       -- I recommended over-the-counter  medication(s) as charted above & in discharge instructions           I, Jarrell Munoz, am serving as a scribe to document services personally performed by Dr. Evans Sousa based on my observation and the provider's statements to me. I, Evans Sousa MD attest that Jarrell Munoz is acting in a scribe capacity, has observed my performance of the services and has documented them in accordance with my direction.      Evans Sousa MD  06/04/25  Emergency Medicine  [facility] EMERGENCY ROOM  [facility] [facility] [address]  [phone]  Dept: [phone]     Evans Sousa MD  06/04/25 0743       Evans Sousa MD  06/04/25 0744

## 2025-06-04 NOTE — ED TRIAGE NOTES
Pt reports reports 3 weeks ago seen in ER for lower leg swelling and wounds.  R calf wound worsening in past week. Redness surrounding the wound. Pinching sensation worse when he wears compression stockings. Denies any fevers or chills. On blood thinner and diuretic. States worried because has knee replacement surgery scheduled later this month. Pt alert and ambulatory.     Triage Assessment (Adult)       Row Name 06/04/25 0718          Triage Assessment    Airway WDL WDL        Respiratory WDL    Respiratory WDL X;rhythm/pattern     Rhythm/Pattern, Respiratory tachypneic        Skin Circulation/Temperature WDL    Skin Circulation/Temperature WDL X  L shin wound, R calf wound with redness around both        Cardiac WDL    Cardiac WDL WDL        Peripheral/Neurovascular WDL    Peripheral Neurovascular WDL X  bilateral lower edema        Cognitive/Neuro/Behavioral WDL    Cognitive/Neuro/Behavioral WDL WDL

## 2025-06-04 NOTE — DISCHARGE INSTRUCTIONS
Take the antibiotics (cefdinir & clindamycin) to treat the skin infection.    Increase your Lasix to 40mg one time daily for the next 2 weeks to help decrease the edema in your legs.    Follow up with your Primary Care provider in 2 days for a recheck.    Return to the Emergency Department for any difficulty breathing, persistent vomiting, severe worsening, or any other concerns.

## 2025-07-08 ENCOUNTER — LAB REQUISITION (OUTPATIENT)
Dept: LAB | Facility: CLINIC | Age: 85
End: 2025-07-08
Payer: COMMERCIAL

## 2025-07-08 DIAGNOSIS — I50.9 HEART FAILURE, UNSPECIFIED (H): ICD-10-CM

## 2025-07-09 ENCOUNTER — LAB REQUISITION (OUTPATIENT)
Dept: LAB | Facility: CLINIC | Age: 85
End: 2025-07-09
Payer: COMMERCIAL

## 2025-07-09 DIAGNOSIS — R53.1 WEAKNESS: ICD-10-CM

## 2025-07-09 LAB
INR PPP: 1.19 (ref 0.85–1.15)
PROTHROMBIN TIME: 15 SECONDS (ref 11.8–14.8)

## 2025-07-09 PROCEDURE — 36415 COLL VENOUS BLD VENIPUNCTURE: CPT | Mod: ORL | Performed by: INTERNAL MEDICINE

## 2025-07-09 PROCEDURE — 85610 PROTHROMBIN TIME: CPT | Mod: ORL | Performed by: INTERNAL MEDICINE

## 2025-07-09 PROCEDURE — P9603 ONE-WAY ALLOW PRORATED MILES: HCPCS | Mod: ORL | Performed by: INTERNAL MEDICINE

## 2025-07-10 LAB
ANION GAP SERPL CALCULATED.3IONS-SCNC: 7 MMOL/L (ref 7–15)
BASOPHILS # BLD AUTO: 0 10E3/UL (ref 0–0.2)
BASOPHILS NFR BLD AUTO: 0 %
BUN SERPL-MCNC: 37 MG/DL (ref 8–23)
CALCIUM SERPL-MCNC: 8.7 MG/DL (ref 8.8–10.4)
CHLORIDE SERPL-SCNC: 108 MMOL/L (ref 98–107)
CREAT SERPL-MCNC: 1.2 MG/DL (ref 0.67–1.17)
EGFRCR SERPLBLD CKD-EPI 2021: 59 ML/MIN/1.73M2
EOSINOPHIL # BLD AUTO: 0 10E3/UL (ref 0–0.7)
EOSINOPHIL NFR BLD AUTO: 0 %
ERYTHROCYTE [DISTWIDTH] IN BLOOD BY AUTOMATED COUNT: 14.6 % (ref 10–15)
GLUCOSE SERPL-MCNC: 88 MG/DL (ref 70–99)
HCO3 SERPL-SCNC: 25 MMOL/L (ref 22–29)
HCT VFR BLD AUTO: 40.7 % (ref 40–53)
HGB BLD-MCNC: 12.8 G/DL (ref 13.3–17.7)
IMM GRANULOCYTES # BLD: 0.1 10E3/UL
IMM GRANULOCYTES NFR BLD: 1 %
LYMPHOCYTES # BLD AUTO: 1.6 10E3/UL (ref 0.8–5.3)
LYMPHOCYTES NFR BLD AUTO: 16 %
MCH RBC QN AUTO: 33 PG (ref 26.5–33)
MCHC RBC AUTO-ENTMCNC: 31.4 G/DL (ref 31.5–36.5)
MCV RBC AUTO: 105 FL (ref 78–100)
MONOCYTES # BLD AUTO: 1.1 10E3/UL (ref 0–1.3)
MONOCYTES NFR BLD AUTO: 12 %
NEUTROPHILS # BLD AUTO: 6.8 10E3/UL (ref 1.6–8.3)
NEUTROPHILS NFR BLD AUTO: 71 %
NRBC # BLD AUTO: 0 10E3/UL
NRBC BLD AUTO-RTO: 0 /100
PLATELET # BLD AUTO: 163 10E3/UL (ref 150–450)
POTASSIUM SERPL-SCNC: 4.7 MMOL/L (ref 3.4–5.3)
RBC # BLD AUTO: 3.88 10E6/UL (ref 4.4–5.9)
SODIUM SERPL-SCNC: 140 MMOL/L (ref 135–145)
WBC # BLD AUTO: 9.6 10E3/UL (ref 4–11)

## 2025-07-10 PROCEDURE — 80048 BASIC METABOLIC PNL TOTAL CA: CPT | Mod: ORL | Performed by: INTERNAL MEDICINE

## 2025-07-10 PROCEDURE — 85025 COMPLETE CBC W/AUTO DIFF WBC: CPT | Mod: ORL | Performed by: INTERNAL MEDICINE

## 2025-07-10 PROCEDURE — P9604 ONE-WAY ALLOW PRORATED TRIP: HCPCS | Mod: ORL | Performed by: INTERNAL MEDICINE

## 2025-07-10 PROCEDURE — 36415 COLL VENOUS BLD VENIPUNCTURE: CPT | Mod: ORL | Performed by: INTERNAL MEDICINE

## 2025-07-14 ENCOUNTER — LAB REQUISITION (OUTPATIENT)
Dept: LAB | Facility: CLINIC | Age: 85
End: 2025-07-14
Payer: COMMERCIAL

## 2025-07-14 DIAGNOSIS — R53.1 WEAKNESS: ICD-10-CM

## 2025-07-15 ENCOUNTER — LAB REQUISITION (OUTPATIENT)
Dept: LAB | Facility: CLINIC | Age: 85
End: 2025-07-15
Payer: COMMERCIAL

## 2025-07-15 DIAGNOSIS — I50.9 HEART FAILURE, UNSPECIFIED (H): ICD-10-CM

## 2025-07-15 LAB
ANION GAP SERPL CALCULATED.3IONS-SCNC: 7 MMOL/L (ref 7–15)
BASOPHILS # BLD AUTO: 0 10E3/UL (ref 0–0.2)
BASOPHILS NFR BLD AUTO: 1 %
BUN SERPL-MCNC: 24 MG/DL (ref 8–23)
CALCIUM SERPL-MCNC: 8.5 MG/DL (ref 8.8–10.4)
CHLORIDE SERPL-SCNC: 107 MMOL/L (ref 98–107)
CREAT SERPL-MCNC: 0.92 MG/DL (ref 0.67–1.17)
EGFRCR SERPLBLD CKD-EPI 2021: 82 ML/MIN/1.73M2
EOSINOPHIL # BLD AUTO: 0.3 10E3/UL (ref 0–0.7)
EOSINOPHIL NFR BLD AUTO: 4 %
ERYTHROCYTE [DISTWIDTH] IN BLOOD BY AUTOMATED COUNT: 14.1 % (ref 10–15)
GLUCOSE SERPL-MCNC: 85 MG/DL (ref 70–99)
HCO3 SERPL-SCNC: 26 MMOL/L (ref 22–29)
HCT VFR BLD AUTO: 38.9 % (ref 40–53)
HGB BLD-MCNC: 12.5 G/DL (ref 13.3–17.7)
IMM GRANULOCYTES # BLD: 0 10E3/UL
IMM GRANULOCYTES NFR BLD: 1 %
LYMPHOCYTES # BLD AUTO: 1.4 10E3/UL (ref 0.8–5.3)
LYMPHOCYTES NFR BLD AUTO: 21 %
MCH RBC QN AUTO: 33.4 PG (ref 26.5–33)
MCHC RBC AUTO-ENTMCNC: 32.1 G/DL (ref 31.5–36.5)
MCV RBC AUTO: 104 FL (ref 78–100)
MONOCYTES # BLD AUTO: 0.9 10E3/UL (ref 0–1.3)
MONOCYTES NFR BLD AUTO: 13 %
NEUTROPHILS # BLD AUTO: 4 10E3/UL (ref 1.6–8.3)
NEUTROPHILS NFR BLD AUTO: 60 %
NRBC # BLD AUTO: 0 10E3/UL
NRBC BLD AUTO-RTO: 0 /100
PLATELET # BLD AUTO: 172 10E3/UL (ref 150–450)
POTASSIUM SERPL-SCNC: 3.9 MMOL/L (ref 3.4–5.3)
RBC # BLD AUTO: 3.74 10E6/UL (ref 4.4–5.9)
SODIUM SERPL-SCNC: 140 MMOL/L (ref 135–145)
WBC # BLD AUTO: 6.6 10E3/UL (ref 4–11)

## (undated) DEVICE — ENDO SNARE EXACTO COLD 9MM LOOP 2.4MMX230CM 00711115

## (undated) DEVICE — SUCTION MANIFOLD NEPTUNE 2 SYS 1 PORT 702-025-000

## (undated) DEVICE — SOL WATER IRRIG 1000ML BOTTLE 2F7114

## (undated) DEVICE — TUBING SUCTION MEDI-VAC 1/4"X20' N620A